# Patient Record
Sex: FEMALE | Race: WHITE | NOT HISPANIC OR LATINO | Employment: FULL TIME | URBAN - NONMETROPOLITAN AREA
[De-identification: names, ages, dates, MRNs, and addresses within clinical notes are randomized per-mention and may not be internally consistent; named-entity substitution may affect disease eponyms.]

---

## 2017-06-29 ENCOUNTER — TRANSCRIBE ORDERS (OUTPATIENT)
Dept: ADMINISTRATIVE | Facility: HOSPITAL | Age: 62
End: 2017-06-29

## 2017-06-29 DIAGNOSIS — Z13.9 SCREENING: Primary | ICD-10-CM

## 2017-07-18 ENCOUNTER — HOSPITAL ENCOUNTER (OUTPATIENT)
Dept: MAMMOGRAPHY | Facility: HOSPITAL | Age: 62
Discharge: HOME | End: 2017-07-18
Admitting: FAMILY MEDICINE

## 2017-07-18 DIAGNOSIS — Z13.9 SCREENING: ICD-10-CM

## 2017-07-18 PROCEDURE — G0202 SCR MAMMO BI INCL CAD: HCPCS

## 2017-07-18 PROCEDURE — 77063 BREAST TOMOSYNTHESIS BI: CPT

## 2017-11-08 ENCOUNTER — TRANSCRIBE ORDERS (OUTPATIENT)
Dept: ADMINISTRATIVE | Facility: HOSPITAL | Age: 62
End: 2017-11-08

## 2017-11-08 DIAGNOSIS — S80.01XA CONTUSION OF RIGHT KNEE, INITIAL ENCOUNTER: Primary | ICD-10-CM

## 2017-11-09 ENCOUNTER — HOSPITAL ENCOUNTER (OUTPATIENT)
Dept: MRI IMAGING | Facility: HOSPITAL | Age: 62
Discharge: HOME | End: 2017-11-09
Admitting: NURSE PRACTITIONER

## 2017-11-09 DIAGNOSIS — S80.01XA CONTUSION OF RIGHT KNEE, INITIAL ENCOUNTER: ICD-10-CM

## 2017-11-09 PROCEDURE — 73721 MRI JNT OF LWR EXTRE W/O DYE: CPT

## 2017-11-15 ENCOUNTER — OFFICE VISIT (OUTPATIENT)
Dept: ORTHOPEDIC SURGERY | Facility: CLINIC | Age: 62
End: 2017-11-15

## 2017-11-15 VITALS
BODY MASS INDEX: 33.82 KG/M2 | SYSTOLIC BLOOD PRESSURE: 163 MMHG | DIASTOLIC BLOOD PRESSURE: 74 MMHG | HEART RATE: 71 BPM | WEIGHT: 203 LBS | HEIGHT: 65 IN

## 2017-11-15 DIAGNOSIS — S80.01XA CONTUSION OF RIGHT KNEE, INITIAL ENCOUNTER: ICD-10-CM

## 2017-11-15 DIAGNOSIS — M25.561 ACUTE PAIN OF RIGHT KNEE: Primary | ICD-10-CM

## 2017-11-15 DIAGNOSIS — M70.41 PREPATELLAR BURSITIS OF RIGHT KNEE: ICD-10-CM

## 2017-11-15 PROCEDURE — 99204 OFFICE O/P NEW MOD 45 MIN: CPT | Performed by: ORTHOPAEDIC SURGERY

## 2017-11-15 RX ORDER — ESTRADIOL 0.5 MG/1
0.5 TABLET ORAL DAILY
COMMUNITY

## 2017-11-15 RX ORDER — HYDROCODONE BITARTRATE AND ACETAMINOPHEN 7.5; 325 MG/1; MG/1
TABLET ORAL
Refills: 0 | COMMUNITY
Start: 2017-10-31 | End: 2019-11-25

## 2017-11-15 NOTE — PROGRESS NOTES
[facility] [place] [facility] Note    Subjective     Chief Complaint   Patient presents with   • Right Knee - Pain        HPI      Zuri Love is a 62 y.o. female.  Patient had a fall at work on October 31, 2017.  She's doing better pain is 1 out of 10.  It is aching and stabbing.  It is worse with standing better with ice.        Past Medical History:   Diagnosis Date   • Ulcer       Past Surgical History:   Procedure Laterality Date   • FACIAL RECONSTRUCTION SURGERY     • HYSTERECTOMY     • KNEE SURGERY Left 02/2015    arthroscopy      Family History   Problem Relation Age of Onset   • Hypertension Mother    • Hypertension Father    • Diabetes Father      Social History     Social History   • Marital status: Single     Spouse name: N/A   • Number of children: N/A   • Years of education: N/A     Occupational History   • Not on file.     Social History Main Topics   • Smoking status: Never Smoker   • Smokeless tobacco: Never Used   • Alcohol use Yes      Comment: occ   • Drug use: No   • Sexual activity: Defer     Other Topics Concern   • Not on file     Social History Narrative      No current outpatient prescriptions on file prior to visit.     No current facility-administered medications on file prior to visit.       No Known Allergies     The following portions of the patient's history were reviewed and updated as appropriate: allergies, current medications, past family history, past medical history, past social history, past surgical history and problem list.    Review of Systems   Constitutional: Negative.    HENT: Positive for tinnitus.    Eyes: Negative.    Respiratory: Negative.    Cardiovascular: Negative.    Gastrointestinal: Negative.    Endocrine: Negative.    Genitourinary: Negative.    Musculoskeletal: Positive for arthralgias.   Skin: Negative.    Allergic/Immunologic: Negative.    Neurological: Negative.    Hematological: Negative.    Psychiatric/Behavioral: Negative.      "    Objective      Physical Exam  /74  Pulse 71  Ht 65\" (165.1 cm)  Wt 203 lb (92.1 kg)  BMI 33.78 kg/m2    Body mass index is 33.78 kg/(m^2).        GENERAL APPEARANCE: awake, alert & oriented x 3, in no acute distress and well developed, well nourished  PSYCH: normal mood andaffect  LUNGS:  breathing nonlabored, no wheezing  EYES: sclera anicteric, pupils equal  CARDIOVASCULAR: palpable pulses dorsalis pedis, palpable posterior tibial bilaterally. Capillary refill less than 2 seconds  INTEGUMENTARY: skin intact, no clubbing, cyanosis  NEUROLOGIC:  Normal gait and balance            Ortho Exam  Peripheral Vascular:    Upper Extremity:   Inspection:  Left--no cyanotic nail beds Right--no cyanotic nail beds   Bilateral:  Pink nail beds with brisk capillary refill   Palpation:  Bilateral radial pulse normal    Musculoskeletal:  Global Assessment:  Overall assessment of Lower Extremity Muscle Strength and Tone:    Right quadriceps--5/5  Right hamstrings--5/5  Right tibialis anterior--5/5  Right gastroc soleus--5/5  Right EHL--5/5    Lower Extremity:  Knee/Patella:  No digital clubbing or cyanosis.    Examination of right knee reveals:  Normal deep tendon reflexes, coordination, strength, tone, sensation.  No known fractures or deformities.    Inspection and Palpation:      Right knee:  Tenderness:  Anterior knee with prepatellar bursal swelling there is no erythema.  Effusion:  none  Crepitus:  none  Pulses:  2+  Ecchymosis:  None  Warmth:  None     ROM:  Right:  Extension:0    Flexion:135  Left:  Extension:0     Flexion:135    Instability:      Right:  Lachman Test:  Negative, Varus stress test negative, Valgus stress test negative   Anterior Drawer Test:  Negative, Posterior Drawer Test:  Negative    Deformities/Malalignments/Discrepancies:    Left:  none  Right:  none    Functional Testing:  Right:  Jamal's test:  Negative  Patella grind test:  Negative  Q-angle:  Normal  Apprehension Sign:  " Negative        Imaging/Studies  Imaging Results (last 24 hours)     Procedure Component Value Units Date/Time    XR Knee 1 or 2 View Right [[id number]] Resulted:  11/15/17 1411     Updated:  11/15/17 1411    Narrative:       Knee X-Ray  Indication: Pain    Upright AP  Lateral,  views of right knee     Findings:  No fracture  No bony lesion  Normal soft tissues  Normal joint spaces    No prior studies were available for comparison.            Assessment/Plan      Zuri was seen today for pain.    Diagnoses and all orders for this visit:    Acute pain of right knee  -     XR Knee 1 or 2 View Right    Contusion of right knee, initial encounter    Prepatellar bursitis of right knee    She will continue rest ice compression and elevation.  She'll follow up in 3 weeks.  She can work without restriction Medical Decision Making  Management Options : over-the-counter medicine  Data/Risk: radiology tests and independent visualization of imaging, lab tests, or EMG/NCV    Bg Sunshine MD  11/15/17  2:11 PM

## 2017-12-04 ENCOUNTER — OFFICE VISIT (OUTPATIENT)
Dept: ORTHOPEDIC SURGERY | Facility: CLINIC | Age: 62
End: 2017-12-04

## 2017-12-04 VITALS
HEIGHT: 65 IN | WEIGHT: 200.62 LBS | SYSTOLIC BLOOD PRESSURE: 152 MMHG | BODY MASS INDEX: 33.43 KG/M2 | HEART RATE: 84 BPM | DIASTOLIC BLOOD PRESSURE: 76 MMHG

## 2017-12-04 DIAGNOSIS — M70.41 PREPATELLAR BURSITIS OF RIGHT KNEE: Primary | ICD-10-CM

## 2017-12-04 PROCEDURE — 99212 OFFICE O/P EST SF 10 MIN: CPT | Performed by: ORTHOPAEDIC SURGERY

## 2017-12-04 NOTE — PROGRESS NOTES
[facility] [place] [facility] Note    Subjective     Chief Complaint   Patient presents with   • Follow-up     3 week - Acute pain of right knee        HPI      Zuri Love is a 62 y.o. female.  She is doing much better with the right knee pain is 0 out of 10.  It does not keep her from doing anything.        Past Medical History:   Diagnosis Date   • Ulcer       Past Surgical History:   Procedure Laterality Date   • FACIAL RECONSTRUCTION SURGERY     • HYSTERECTOMY     • KNEE SURGERY Left 02/2015    arthroscopy      Family History   Problem Relation Age of Onset   • Hypertension Mother    • Hypertension Father    • Diabetes Father      Social History     Social History   • Marital status: Single     Spouse name: N/A   • Number of children: N/A   • Years of education: N/A     Occupational History   • Not on file.     Social History Main Topics   • Smoking status: Never Smoker   • Smokeless tobacco: Never Used   • Alcohol use Yes      Comment: occ   • Drug use: No   • Sexual activity: Defer     Other Topics Concern   • Not on file     Social History Narrative      Current Outpatient Prescriptions on File Prior to Visit   Medication Sig Dispense Refill   • Ascorbic Acid (VITAMIN C PO) Take  by mouth.     • Calcium-Magnesium-Vitamin D (CALCIUM 500 PO) Take  by mouth.     • estradiol (ESTRACE) 0.5 MG tablet Take 0.5 mg by mouth Daily.     • HYDROcodone-acetaminophen (NORCO) 7.5-325 MG per tablet TAKE 1 TABLET BY MOUTH EVERY 6 TO 8 HOURS AS NEEDED FOR PAIN  0   • Iodine, Kelp, (KELP PO) Take  by mouth.     • Misc Natural Products (GLUCOSAMINE CHONDROITIN ADV PO) Take  by mouth.     • Omega-3 Fatty Acids (FISH OIL PO) Take  by mouth.     • TURMERIC PO Take  by mouth.     • Unable to find 1 each 1 (One) Time. Med Name:   CBD OIL- 1 squirt       No current facility-administered medications on file prior to visit.       No Known Allergies     The following portions of the patient's history were reviewed and  "updated as appropriate: allergies, current medications, past family history, past medical history, past social history, past surgical history and problem list.    Review of Systems   Constitutional: Negative.    HENT: Negative.    Eyes: Negative.    Respiratory: Negative.    Cardiovascular: Negative.    Gastrointestinal: Negative.    Endocrine: Negative.    Genitourinary: Negative.    Musculoskeletal: Positive for arthralgias.   Skin: Negative.    Allergic/Immunologic: Negative.    Neurological: Negative.    Hematological: Negative.    Psychiatric/Behavioral: Negative.         Objective      Physical Exam  /76  Pulse 84  Ht 65\" (165.1 cm)  Wt 200 lb 9.9 oz (91 kg)  BMI 33.38 kg/m2    Body mass index is 33.38 kg/(m^2).        GENERAL APPEARANCE: awake, alert & oriented x 3, in no acute distress and well developed, well nourished  PSYCH: normal mood andaffect  LUNGS:  breathing nonlabored, no wheezing  EYES: sclera anicteric, pupils equal  CARDIOVASCULAR: palpable pulses dorsalis pedis, palpable posterior tibial bilaterally. Capillary refill less than 2 seconds  INTEGUMENTARY: skin intact, no clubbing, cyanosis  NEUROLOGIC:  Normal gait and balance            Ortho Exam  Peripheral Vascular:    Upper Extremity:   Inspection:  Left--no cyanotic nail beds Right--no cyanotic nail beds   Bilateral:  Pink nail beds with brisk capillary refill   Palpation:  Bilateral radial pulse normal    Musculoskeletal:  Global Assessment:  Overall assessment of Lower Extremity Muscle Strength and Tone:    Right quadriceps--5/5  Right hamstrings--5/5  Right tibialis anterior--5/5  Right gastroc soleus--5/5  Right EHL--5/5    Lower Extremity:  Knee/Patella:  No digital clubbing or cyanosis.    Examination of right knee reveals:  Normal deep tendon reflexes, coordination, strength, tone, sensation.  No known fractures or deformities.    Inspection and Palpation:      Right knee:  Tenderness:  none  Effusion:  none  Crepitus:  " none  Pulses:  2+  Ecchymosis:  None  Warmth:  None     ROM:  Right:  Extension:0    Flexion:135  Left:  Extension:0     Flexion:135    Instability:      Right:  Lachman Test:  Negative, Varus stress test negative, Valgus stress test negative   Anterior Drawer Test:  Negative, Posterior Drawer Test:  Negative    Deformities/Malalignments/Discrepancies:    Left:  none  Right:  none    Functional Testing:  Right:  Jamal's test:  Negative  Patella grind test:  Negative  Q-angle:  Normal  Apprehension Sign:  Negative        Imaging/Studies  Imaging Results (last 24 hours)     ** No results found for the last 24 hours. **          Assessment/Plan      Zuri was seen today for follow-up.    Diagnoses and all orders for this visit:    Prepatellar bursitis of right knee      She is doing great and will follow-up as needed    Medical Decision Making  Management Options : over-the-counter medicine      Bg Sunshine MD  12/04/17  10:46 AM

## 2018-01-10 ENCOUNTER — OFFICE VISIT (OUTPATIENT)
Dept: ORTHOPEDIC SURGERY | Facility: CLINIC | Age: 63
End: 2018-01-10

## 2018-01-10 VITALS
DIASTOLIC BLOOD PRESSURE: 76 MMHG | BODY MASS INDEX: 34.16 KG/M2 | SYSTOLIC BLOOD PRESSURE: 136 MMHG | HEIGHT: 65 IN | WEIGHT: 205.03 LBS | HEART RATE: 74 BPM

## 2018-01-10 DIAGNOSIS — M70.41 PREPATELLAR BURSITIS OF RIGHT KNEE: Primary | ICD-10-CM

## 2018-01-10 PROCEDURE — 99213 OFFICE O/P EST LOW 20 MIN: CPT | Performed by: ORTHOPAEDIC SURGERY

## 2018-01-10 NOTE — PROGRESS NOTES
[facility] [place] [facility] [place]    Subjective     Chief Complaint   Patient presents with   • Right Knee - Follow-up     5 week follow up        HPI      Zuri Love is a 62 y.o. female.  Follow work over a month ago.  She had a MRI back in November which showed prepatellar bursitis soft tissue edema and small radial tear of the medial meniscus. she has not been in physical therapy.  Her pain is 7 out of 10 and she believe she is getting worse.  She works for herself and his full duty.  She complains of being unable to walk without a limp.        Past Medical History:   Diagnosis Date   • Ulcer       Past Surgical History:   Procedure Laterality Date   • FACIAL RECONSTRUCTION SURGERY     • HYSTERECTOMY     • KNEE SURGERY Left 02/2015    arthroscopy      Family History   Problem Relation Age of Onset   • Hypertension Mother    • Hypertension Father    • Diabetes Father      Social History     Social History   • Marital status: Single     Spouse name: N/A   • Number of children: N/A   • Years of education: N/A     Occupational History   • Not on file.     Social History Main Topics   • Smoking status: Never Smoker   • Smokeless tobacco: Never Used   • Alcohol use Yes      Comment: occ   • Drug use: No   • Sexual activity: Defer     Other Topics Concern   • Not on file     Social History Narrative      Current Outpatient Prescriptions on File Prior to Visit   Medication Sig Dispense Refill   • Ascorbic Acid (VITAMIN C PO) Take  by mouth.     • Calcium-Magnesium-Vitamin D (CALCIUM 500 PO) Take  by mouth.     • estradiol (ESTRACE) 0.5 MG tablet Take 0.5 mg by mouth Daily.     • HYDROcodone-acetaminophen (NORCO) 7.5-325 MG per tablet TAKE 1 TABLET BY MOUTH EVERY 6 TO 8 HOURS AS NEEDED FOR PAIN  0   • Iodine, Kelp, (KELP PO) Take  by mouth.     • Misc Natural Products (GLUCOSAMINE CHONDROITIN ADV PO) Take  by mouth.     • Omega-3 Fatty Acids (FISH OIL PO) Take  by mouth.     • TURMERIC PO Take  by  "mouth.     • Unable to find 1 each 1 (One) Time. Med Name:   CBD OIL- 1 squirt       No current facility-administered medications on file prior to visit.       No Known Allergies     The following portions of the patient's history were reviewed and updated as appropriate: allergies, current medications, past family history, past medical history, past social history, past surgical history and problem list.    Review of Systems   Constitutional: Negative.    HENT: Negative.    Eyes: Negative.    Respiratory: Negative.    Cardiovascular: Negative.    Gastrointestinal: Negative.    Endocrine: Negative.    Genitourinary: Negative.    Musculoskeletal: Positive for arthralgias.   Skin: Negative.    Allergic/Immunologic: Negative.    Neurological: Negative.    Hematological: Negative.    Psychiatric/Behavioral: Negative.         Objective      Physical Exam  /76  Pulse 74  Ht 165.1 cm (65\")  Wt 93 kg (205 lb 0.4 oz)  BMI 34.12 kg/m2    Body mass index is 34.12 kg/(m^2).        GENERAL APPEARANCE: awake, alert & oriented x 3, in no acute distress and well developed, well nourished  PSYCH: normal mood andaffect  LUNGS:  breathing nonlabored, no wheezing  EYES: sclera anicteric, pupils equal  CARDIOVASCULAR: palpable pulses dorsalis pedis, palpable posterior tibial bilaterally. Capillary refill less than 2 seconds  INTEGUMENTARY: skin intact, no clubbing, cyanosis  NEUROLOGIC:  Limping gait and balance            Ortho Exam  Peripheral Vascular:    Upper Extremity:   Inspection:  Left--no cyanotic nail beds Right--no cyanotic nail beds   Bilateral:  Pink nail beds with brisk capillary refill   Palpation:  Bilateral radial pulse normal    Musculoskeletal:  Global Assessment:  Overall assessment of Lower Extremity Muscle Strength and Tone:  Left quadriceps--5/5   Left hamstrings--5/5       Left tibialis anterior--5/5  Left gastroc-soleus--5/5  Left EHL --5/5    Lower Extremity:  Knee/Patella:  No digital clubbing or " cyanosis.    Examination of left knee reveals:  Normal deep tendon reflexes, coordination, strength, tone, sensation.  No known fractures or deformities.    Inspection and Palpation:  Left knee:  Tenderness:  Over the medial patella with no joint line tenderness  Effusion:  none  Crepitus:  Positive  Pulses:  2+  Ecchymosis:  None  Warmth:  None     ROM:  Right:  Extension:5    Flexion:120  Left:  Extension:5     Flexion:120    Instability:    Left:  Lachman Test:  Negative, Varus stress test negative, Valgus stress test negative    Deformities/Malalignments/Discrepancies:    Left:  Genu Varum   Right:  No deformity    Functional Testing:  Jamla's test:  Negative  Patella grind test:  Positive  Q-angle:  normalPeripheral Vascular:    Upper Extremity:   Inspection:  Left--no cyanotic nail beds Right--no cyanotic nail beds   Bilateral:  Pink nail beds with brisk capillary refill   Palpation:  Bilateral radial pulse normal    Musculoskeletal:  Global Assessment:  Overall assessment of Lower Extremity Muscle Strength and Tone:    Right quadriceps--5/5  Right hamstrings--5/5  Right tibialis anterior--5/5  Right gastroc soleus--5/5  Right EHL--5/5    Lower Extremity:  Knee/Patella:  No digital clubbing or cyanosis.    Examination of right knee reveals:  Normal deep tendon reflexes, coordination, strength, tone, sensation.  No known fractures or deformities.    Inspection and Palpation:      Right knee:  Tenderness:  none  Effusion:  none  Crepitus:  none  Pulses:  2+  Ecchymosis:  None  Warmth:  None     ROM:  Right:  Extension:0    Flexion:135  Left:  Extension:0     Flexion:135    Instability:      Right:  Lachman Test:  Negative, Varus stress test negative, Valgus stress test negative   Anterior Drawer Test:  Negative, Posterior Drawer Test:  Negative    Deformities/Malalignments/Discrepancies:    Left:  none  Right:  none    Functional Testing:  Right:  Jamal's test:  Negative  Patella grind test:   Negative  Q-angle:  Normal  Apprehension Sign:  Negative        Imaging/Studies  Imaging Results (last 24 hours)     ** No results found for the last 24 hours. **      I reviewed her MRI from November which shows prepatellar swelling soft tissue edema and a small radial meniscus tear    Assessment/Plan      Zuri was seen today for follow-up.    Diagnoses and all orders for this visit:    Prepatellar bursitis of right knee  -     Ambulatory Referral to Physical Therapy  She needs physical therapy to improve her gait and she will follow-up in 3-4 weeks at this time she does not need surgery I believe her meniscus tear to be an incidental asymptomatic finding    Medical Decision Making  Management Options : over-the-counter medicine and physical/occupational therapy  Data/Risk: radiology tests and independent visualization of imaging, lab tests, or EMG/NCV    Bg Sunshine MD  01/10/18  2:41 PM

## 2018-01-25 ENCOUNTER — TREATMENT (OUTPATIENT)
Dept: PHYSICAL THERAPY | Facility: CLINIC | Age: 63
End: 2018-01-25

## 2018-01-25 DIAGNOSIS — M25.561 ACUTE PAIN OF RIGHT KNEE: Primary | ICD-10-CM

## 2018-01-25 PROCEDURE — 97001 PR PHYS THERAPY EVALUATION: CPT | Performed by: PHYSICAL THERAPIST

## 2018-01-25 PROCEDURE — 97530 THERAPEUTIC ACTIVITIES: CPT | Performed by: PHYSICAL THERAPIST

## 2018-01-25 NOTE — PROGRESS NOTES
Physical Therapy Initial Evaluation and Plan of Care      Patient: Zuri Love   : 1955  Diagnosis/ICD-10 Code:  No primary diagnosis found.  Referring practitioner: Bg Sunshine MD    Subjective Evaluation    History of Present Illness  Mechanism of injury: Pt reports  she fell onto the R knee.  Very painful.      Pt recently has been more diligent with ibuprofen and icing.  Over the past 10 days her pain has greatly reduced.  She currently has no pain at this time.       Patient Occupation: instrument repair at [employer]  Current pain ratin  At worst pain rating: 3 (this past week.   pt reports 10/10 pain)  Location: medial knee  Relieving factors: medications, ice and rest  Aggravating factors: stairs and squatting  Progression: resolved             Objective       Palpation     Additional Palpation Details  No pain upon palpation today.     Active Range of Motion   Left Knee   Flexion: WFL  Extension: WFL    Right Knee   Flexion: WFL  Extension: WFL    Patellar Mobility     Right Knee Patellar tendons within functional limits include the medial, lateral, superior and inferior.     Patellar Static Positioning   Left Knee: WFL  Right Knee: WFL    Strength/Myotome Testing     Left Hip   Planes of Motion   Extension: 4+  Abduction: 4+    Right Hip   Planes of Motion   Extension: 4  Abduction: 4    Left Knee   Flexion: 5  Prone flexion: 5  Extension: 5  Quadriceps contraction: good    Right Knee   Flexion: 4+  Prone flexion: 4+  Extension: 4+  Quadriceps contraction: good    Tests     Right Knee   Negative patellar apprehension and patellar compression.     Ambulation     Observational Gait   Gait: within functional limits   Walking speed and stride length within functional limits.   Base of support: normal    Functional Assessment     Single Leg Stance   Left: 15 seconds  Right: 15 seconds         Assessment & Plan     Assessment  Assessment details: Patient is a 62 year  old female who comes to physical therapy s/p knee contusion injury. Signs and symptoms are consistent with diagnosis but she recently has seen success with anti-inflammatories and ice.  Over the last 10 days she has been pain free and returning to normal function.  She want to try to return to all activity and feels like no PT is needed at this time.  Pt instructed on HEP and avoiding PF pain with activity.    Prognosis: good    Plan  Treatment plan discussed with: patient  Plan details: Pt to call within 3 weeks if sxs re-occur to be re-assessed.             Manual Therapy:         mins  13610;  Therapeutic Exercise:         mins  18951;     Neuromuscular Charmaine:        mins  61306;    Therapeutic Activity:     9     mins  27428;     Gait Training:           mins  38255;     Ultrasound:          mins  43021;    Electrical Stimulation:         mins  84749 ( );  Dry Needling          mins self-pay    Timed Treatment:   9   mins   Total Treatment:     36   mins    PT SIGNATURE: Pawel Walsh, PT   DATE TREATMENT INITIATED: 1/25/2018    Initial Certification  Certification Period: 4/25/2018  I certify that the therapy services are furnished while this patient is under my care.  The services outlined above are required by this patient, and will be reviewed every 90 days.     PHYSICIAN: Bg Sunshine MD      DATE:     Please sign and return via fax to  .. Thank you, [facility].

## 2018-07-26 ENCOUNTER — TRANSCRIBE ORDERS (OUTPATIENT)
Dept: ADMINISTRATIVE | Facility: HOSPITAL | Age: 63
End: 2018-07-26

## 2018-07-26 DIAGNOSIS — Z12.39 SCREENING BREAST EXAMINATION: Primary | ICD-10-CM

## 2018-08-30 ENCOUNTER — APPOINTMENT (OUTPATIENT)
Dept: MAMMOGRAPHY | Facility: HOSPITAL | Age: 63
End: 2018-08-30

## 2018-10-01 ENCOUNTER — APPOINTMENT (OUTPATIENT)
Dept: MAMMOGRAPHY | Facility: HOSPITAL | Age: 63
End: 2018-10-01

## 2018-10-09 ENCOUNTER — HOSPITAL ENCOUNTER (OUTPATIENT)
Dept: MAMMOGRAPHY | Facility: HOSPITAL | Age: 63
Discharge: HOME | End: 2018-10-09
Admitting: FAMILY MEDICINE

## 2018-10-09 DIAGNOSIS — Z12.39 SCREENING BREAST EXAMINATION: ICD-10-CM

## 2018-10-09 PROCEDURE — 77067 SCR MAMMO BI INCL CAD: CPT

## 2018-10-09 PROCEDURE — 77063 BREAST TOMOSYNTHESIS BI: CPT

## 2018-11-27 ENCOUNTER — TRANSCRIBE ORDERS (OUTPATIENT)
Dept: ADMINISTRATIVE | Facility: HOSPITAL | Age: 63
End: 2018-11-27

## 2018-11-27 ENCOUNTER — APPOINTMENT (OUTPATIENT)
Dept: LAB | Facility: HOSPITAL | Age: 63
End: 2018-11-27

## 2018-11-27 DIAGNOSIS — Z00.00 ENCOUNTER FOR GENERAL ADULT MEDICAL EXAMINATION WITHOUT ABNORMAL FINDINGS: Primary | ICD-10-CM

## 2018-11-27 LAB
ALBUMIN SERPL-MCNC: 4.5 G/DL (ref 3.5–5)
ALBUMIN/GLOB SERPL: 1.6 G/DL (ref 1–2)
ALP SERPL-CCNC: 51 U/L (ref 38–126)
ALT SERPL W P-5'-P-CCNC: 36 U/L (ref 13–69)
ANION GAP SERPL CALCULATED.3IONS-SCNC: 8.2 MMOL/L (ref 10–20)
AST SERPL-CCNC: 27 U/L (ref 15–46)
BASOPHILS # BLD AUTO: 0.03 10*3/MM3 (ref 0–0.2)
BASOPHILS NFR BLD AUTO: 0.4 % (ref 0–2.5)
BILIRUB SERPL-MCNC: 0.9 MG/DL (ref 0.2–1.3)
BUN BLD-MCNC: 14 MG/DL (ref 7–20)
BUN/CREAT SERPL: 20 (ref 7.1–23.5)
CALCIUM SPEC-SCNC: 8.8 MG/DL (ref 8.4–10.2)
CHLORIDE SERPL-SCNC: 105 MMOL/L (ref 98–107)
CHOLEST SERPL-MCNC: 214 MG/DL (ref 0–199)
CO2 SERPL-SCNC: 29 MMOL/L (ref 26–30)
CREAT BLD-MCNC: 0.7 MG/DL (ref 0.6–1.3)
DEPRECATED RDW RBC AUTO: 40.3 FL (ref 37–54)
EOSINOPHIL # BLD AUTO: 0.15 10*3/MM3 (ref 0–0.7)
EOSINOPHIL NFR BLD AUTO: 2 % (ref 0–7)
ERYTHROCYTE [DISTWIDTH] IN BLOOD BY AUTOMATED COUNT: 12.3 % (ref 11.5–14.5)
GFR SERPL CREATININE-BSD FRML MDRD: 85 ML/MIN/1.73
GLOBULIN UR ELPH-MCNC: 2.9 GM/DL
GLUCOSE BLD-MCNC: 104 MG/DL (ref 74–98)
HBA1C MFR BLD: 5.6 % (ref 3–6)
HCT VFR BLD AUTO: 41.7 % (ref 37–47)
HDLC SERPL-MCNC: 66 MG/DL (ref 40–60)
HGB BLD-MCNC: 14.1 G/DL (ref 12–16)
IMM GRANULOCYTES # BLD: 0.03 10*3/MM3 (ref 0–0.06)
IMM GRANULOCYTES NFR BLD: 0.4 % (ref 0–0.6)
LDLC SERPL CALC-MCNC: 123 MG/DL (ref 0–99)
LDLC/HDLC SERPL: 1.86 {RATIO}
LYMPHOCYTES # BLD AUTO: 1.97 10*3/MM3 (ref 0.6–3.4)
LYMPHOCYTES NFR BLD AUTO: 26.3 % (ref 10–50)
MCH RBC QN AUTO: 30.5 PG (ref 27–31)
MCHC RBC AUTO-ENTMCNC: 33.8 G/DL (ref 30–37)
MCV RBC AUTO: 90.3 FL (ref 81–99)
MONOCYTES # BLD AUTO: 0.61 10*3/MM3 (ref 0–0.9)
MONOCYTES NFR BLD AUTO: 8.1 % (ref 0–12)
NEUTROPHILS # BLD AUTO: 4.7 10*3/MM3 (ref 2–6.9)
NEUTROPHILS NFR BLD AUTO: 62.8 % (ref 37–80)
NRBC BLD MANUAL-RTO: 0 /100 WBC (ref 0–0)
PLATELET # BLD AUTO: 190 10*3/MM3 (ref 130–400)
PMV BLD AUTO: 10.3 FL (ref 6–12)
POTASSIUM BLD-SCNC: 4.2 MMOL/L (ref 3.5–5.1)
PROT SERPL-MCNC: 7.4 G/DL (ref 6.3–8.2)
RBC # BLD AUTO: 4.62 10*6/MM3 (ref 4.2–5.4)
SODIUM BLD-SCNC: 138 MMOL/L (ref 137–145)
TRIGL SERPL-MCNC: 125 MG/DL
VLDLC SERPL-MCNC: 25 MG/DL
WBC NRBC COR # BLD: 7.49 10*3/MM3 (ref 4.8–10.8)

## 2018-11-27 PROCEDURE — 80061 LIPID PANEL: CPT | Performed by: FAMILY MEDICINE

## 2018-11-27 PROCEDURE — 83036 HEMOGLOBIN GLYCOSYLATED A1C: CPT | Performed by: FAMILY MEDICINE

## 2018-11-27 PROCEDURE — 36415 COLL VENOUS BLD VENIPUNCTURE: CPT | Performed by: FAMILY MEDICINE

## 2018-11-27 PROCEDURE — 85025 COMPLETE CBC W/AUTO DIFF WBC: CPT | Performed by: FAMILY MEDICINE

## 2018-11-27 PROCEDURE — 80053 COMPREHEN METABOLIC PANEL: CPT | Performed by: FAMILY MEDICINE

## 2018-12-04 ENCOUNTER — TRANSCRIBE ORDERS (OUTPATIENT)
Dept: BONE DENSITY | Facility: HOSPITAL | Age: 63
End: 2018-12-04

## 2018-12-04 DIAGNOSIS — Z78.0 POSTMENOPAUSAL: Primary | ICD-10-CM

## 2018-12-11 ENCOUNTER — APPOINTMENT (OUTPATIENT)
Dept: BONE DENSITY | Facility: HOSPITAL | Age: 63
End: 2018-12-11

## 2018-12-11 DIAGNOSIS — Z78.0 POSTMENOPAUSAL: ICD-10-CM

## 2018-12-11 PROCEDURE — 77080 DXA BONE DENSITY AXIAL: CPT

## 2019-09-16 ENCOUNTER — TRANSCRIBE ORDERS (OUTPATIENT)
Dept: ADMINISTRATIVE | Facility: HOSPITAL | Age: 64
End: 2019-09-16

## 2019-09-16 DIAGNOSIS — Z12.31 ENCOUNTER FOR SCREENING MAMMOGRAM FOR MALIGNANT NEOPLASM OF BREAST: Primary | ICD-10-CM

## 2019-10-08 ENCOUNTER — LAB (OUTPATIENT)
Dept: LAB | Facility: HOSPITAL | Age: 64
End: 2019-10-08

## 2019-10-08 ENCOUNTER — OFFICE VISIT (OUTPATIENT)
Dept: GASTROENTEROLOGY | Facility: CLINIC | Age: 64
End: 2019-10-08

## 2019-10-08 VITALS
TEMPERATURE: 97.8 F | DIASTOLIC BLOOD PRESSURE: 73 MMHG | RESPIRATION RATE: 16 BRPM | HEIGHT: 65 IN | WEIGHT: 209 LBS | BODY MASS INDEX: 34.82 KG/M2 | HEART RATE: 78 BPM | SYSTOLIC BLOOD PRESSURE: 170 MMHG

## 2019-10-08 DIAGNOSIS — R11.0 NAUSEA: ICD-10-CM

## 2019-10-08 DIAGNOSIS — R12 HEARTBURN: ICD-10-CM

## 2019-10-08 DIAGNOSIS — R10.31 RIGHT LOWER QUADRANT ABDOMINAL PAIN: ICD-10-CM

## 2019-10-08 DIAGNOSIS — R10.31 RIGHT LOWER QUADRANT ABDOMINAL PAIN: Primary | ICD-10-CM

## 2019-10-08 LAB
ALBUMIN SERPL-MCNC: 4.4 G/DL (ref 3.5–5.2)
ALBUMIN/GLOB SERPL: 1.6 G/DL
ALP SERPL-CCNC: 49 U/L (ref 39–117)
ALT SERPL W P-5'-P-CCNC: 24 U/L (ref 1–33)
ANION GAP SERPL CALCULATED.3IONS-SCNC: 11.8 MMOL/L (ref 5–15)
AST SERPL-CCNC: 22 U/L (ref 1–32)
BILIRUB SERPL-MCNC: 0.6 MG/DL (ref 0.2–1.2)
BUN BLD-MCNC: 17 MG/DL (ref 8–23)
BUN/CREAT SERPL: 14.8 (ref 7–25)
CALCIUM SPEC-SCNC: 9 MG/DL (ref 8.6–10.5)
CHLORIDE SERPL-SCNC: 102 MMOL/L (ref 98–107)
CO2 SERPL-SCNC: 27.2 MMOL/L (ref 22–29)
CREAT BLD-MCNC: 1.15 MG/DL (ref 0.57–1)
CRP SERPL-MCNC: 0.92 MG/DL (ref 0–0.5)
DEPRECATED RDW RBC AUTO: 42.3 FL (ref 37–54)
ERYTHROCYTE [DISTWIDTH] IN BLOOD BY AUTOMATED COUNT: 12.9 % (ref 12.3–15.4)
GFR SERPL CREATININE-BSD FRML MDRD: 48 ML/MIN/1.73
GLOBULIN UR ELPH-MCNC: 2.8 GM/DL
GLUCOSE BLD-MCNC: 100 MG/DL (ref 65–99)
HCT VFR BLD AUTO: 39.3 % (ref 34–46.6)
HGB BLD-MCNC: 13.4 G/DL (ref 12–15.9)
LIPASE SERPL-CCNC: 24 U/L (ref 13–60)
MCH RBC QN AUTO: 30.3 PG (ref 26.6–33)
MCHC RBC AUTO-ENTMCNC: 34.1 G/DL (ref 31.5–35.7)
MCV RBC AUTO: 88.9 FL (ref 79–97)
PLATELET # BLD AUTO: 194 10*3/MM3 (ref 140–450)
PMV BLD AUTO: 11.5 FL (ref 6–12)
POTASSIUM BLD-SCNC: 4 MMOL/L (ref 3.5–5.2)
PROT SERPL-MCNC: 7.2 G/DL (ref 6–8.5)
RBC # BLD AUTO: 4.42 10*6/MM3 (ref 3.77–5.28)
SODIUM BLD-SCNC: 141 MMOL/L (ref 136–145)
WBC NRBC COR # BLD: 6.89 10*3/MM3 (ref 3.4–10.8)

## 2019-10-08 PROCEDURE — 80053 COMPREHEN METABOLIC PANEL: CPT

## 2019-10-08 PROCEDURE — 86140 C-REACTIVE PROTEIN: CPT

## 2019-10-08 PROCEDURE — 36415 COLL VENOUS BLD VENIPUNCTURE: CPT

## 2019-10-08 PROCEDURE — 83690 ASSAY OF LIPASE: CPT

## 2019-10-08 PROCEDURE — 85027 COMPLETE CBC AUTOMATED: CPT

## 2019-10-08 PROCEDURE — 99204 OFFICE O/P NEW MOD 45 MIN: CPT | Performed by: INTERNAL MEDICINE

## 2019-10-08 RX ORDER — ONDANSETRON 4 MG/1
4 TABLET, FILM COATED ORAL EVERY 8 HOURS PRN
Qty: 30 TABLET | Refills: 1 | Status: SHIPPED | OUTPATIENT
Start: 2019-10-08 | End: 2019-11-25

## 2019-10-08 RX ORDER — LEVOFLOXACIN 500 MG/1
500 TABLET, FILM COATED ORAL DAILY
Qty: 7 TABLET | Refills: 0 | Status: SHIPPED | OUTPATIENT
Start: 2019-10-08 | End: 2020-06-15

## 2019-10-08 RX ORDER — METRONIDAZOLE 250 MG/1
TABLET ORAL
Qty: 28 TABLET | Refills: 0 | Status: SHIPPED | OUTPATIENT
Start: 2019-10-08 | End: 2020-06-15

## 2019-10-08 NOTE — PATIENT INSTRUCTIONS
"1. Check CBC, CMP, lipase, C-reactive protein inflammatory marker.  2. Low fiber diet (avoid fruits, vegetables, cereals, fiber supplements, nuts and seeds) for 5 days thereafter low-fat high-fiber diet.  3. Levaquin (levofloxacin). Take 1 tablet by mouth once a day for 7 days. Side effects were discussed.  4. Flagyl (metronidazole) tablets 250 mg. Take 1 tablet one by mouth 4 times a day for 7 days.  Side effects were discussed.  5. Avoid laxatives, enemas for next 5 days.  However, for constipation the patient may use \"stool softeners\" 1-3 per day.  6. May take probiotics such as Align.  Take one orally daily while taking antibiotics and 1-2 weeks thereafter.  7. Colonoscopy: Description of the procedure, risks benefits alternatives and options including non-operative options were discussed with the patient in detail.  The patient understands and wishes to proceed.  This may however be postponed for about 3-4 weeks.  8. The patient may call back in 1 week regarding progress.  9. Zofran 4 mg tablet.  1-to 3 times a day by mouth as needed for nausea.  10. Patient has been advised to avoid calcium and vitamins while taking Levaquin.  Once she finishes the course of antibiotics she may resume calcium supplements and multivitamins.        "

## 2019-10-08 NOTE — PROGRESS NOTES
Chief Complaint   Patient presents with   • Abdominal Pain     History of Present Illness     There is history of RLQ abdominal pain off and on for the last 2 months or so.  The pain is gradual in onset, moderate in severity and aching in character.  Frequency being 3-4 times a week.  The pain may last for 20 to 30 minutes.  There is no radiation of abdominal pain.  Eating certain foods like chocolates and knots make the pain worse.  The patient feels better after a bowel movement.  About 3 weeks ago the patient had some associated fever and chills.  Patient also had cramping in the right lower quadrant while sitting on the toilet.  This was associated with a bowel movement.  She denies history of diarrhea or constipation.      The patient has history of recurrent nausea for the last 2 months.  The nausea is described as moderately severe, frequency being several times a week.  Nauseous feeling may last for a couple of hours.  Eating worsens the symptom however no definite relieving factors of nausea.  There is no associated vomiting.    The patient has a history of reflux off and on for the last several years.  The reflux is moderately severe.  Symptoms are described as retrosternal burning sensation, and indigestion.  There is history of occasional regurgitative symptoms.  Frequency being several times per week.  The symptoms are worse at night.  The patient takes acid suppressive therapy with reasonable control of his symptoms.    Her last colonoscopy was 6 years ago in 2013.  The patient had multiple polyps.  There is no family history of colon cancer, inflammatory bowel disease, celiac disease or chronic liver disease.    There is history of peptic ulcer disease for which the patient was treated in 2003.     Review of Systems   Constitutional: Positive for chills and fatigue. Negative for appetite change, fever and unexpected weight change.   HENT: Negative for mouth sores, nosebleeds and trouble swallowing.     Eyes: Negative for discharge and redness.   Respiratory: Negative for apnea, cough and shortness of breath.    Cardiovascular: Negative for chest pain, palpitations and leg swelling.   Gastrointestinal: Positive for abdominal pain and nausea. Negative for abdominal distention, anal bleeding, blood in stool, constipation, diarrhea and vomiting.   Endocrine: Negative for cold intolerance, heat intolerance and polydipsia.   Genitourinary: Negative for dysuria, hematuria and urgency.   Musculoskeletal: Positive for arthralgias, back pain and myalgias. Negative for joint swelling.   Skin: Negative for rash.   Allergic/Immunologic: Positive for environmental allergies. Negative for food allergies and immunocompromised state.   Neurological: Negative for dizziness, seizures, syncope and headaches.   Hematological: Negative for adenopathy. Does not bruise/bleed easily.   Psychiatric/Behavioral: Negative for dysphoric mood. The patient is not nervous/anxious and is not hyperactive.      There is no problem list on file for this patient.    Past Medical History:   Diagnosis Date   • Arthritis    • Back pain 2000   • Injury of neck    • Kidney stone 2010   • Psoriasis    • Ulcer    • Vision problems      Past Surgical History:   Procedure Laterality Date   • FACIAL RECONSTRUCTION SURGERY     • HYSTERECTOMY  2001   • KNEE SURGERY Left 2017    arthroscopy   • TONSILLECTOMY       Family History   Problem Relation Age of Onset   • Hypertension Mother    • Diverticulitis Mother    • Hypertension Father    • Diabetes Father      Social History     Tobacco Use   • Smoking status: Never Smoker   • Smokeless tobacco: Never Used   Substance Use Topics   • Alcohol use: Yes     Alcohol/week: 1.2 oz     Types: 1 Glasses of wine, 1 Shots of liquor per week     Comment: occ       Current Outpatient Medications:   •  Ascorbic Acid (VITAMIN C PO), Take  by mouth., Disp: , Rfl:   •  estradiol (ESTRACE) 0.5 MG tablet, Take 0.5 mg by mouth  "Daily., Disp: , Rfl:   •  HYDROcodone-acetaminophen (NORCO) 7.5-325 MG per tablet, TAKE 1 TABLET BY MOUTH EVERY 6 TO 8 HOURS AS NEEDED FOR PAIN, Disp: , Rfl: 0  •  Misc Natural Products (GLUCOSAMINE CHONDROITIN ADV PO), Take  by mouth., Disp: , Rfl:   •  Omega-3 Fatty Acids (FISH OIL PO), Take  by mouth., Disp: , Rfl:   •  Red Yeast Rice Extract (RED YEAST RICE PO), Take  by mouth., Disp: , Rfl:   •  TURMERIC PO, Take  by mouth., Disp: , Rfl:   •  Unable to find, 1 each 1 (One) Time. Med Name:  CBD OIL- 1 squirt, Disp: , Rfl:   •  levoFLOXacin (LEVAQUIN) 500 MG tablet, Take 1 tablet by mouth Daily., Disp: 7 tablet, Rfl: 0  •  metroNIDAZOLE (FLAGYL) 250 MG tablet, Take 1 tablet by mouth four times a day x 7 days, Disp: 28 tablet, Rfl: 0  •  ondansetron (ZOFRAN) 4 MG tablet, Take 1 tablet by mouth Every 8 (Eight) Hours As Needed for Nausea or Vomiting., Disp: 30 tablet, Rfl: 1    No Known Allergies    Blood pressure 170/73, pulse 78, temperature 97.8 °F (36.6 °C), resp. rate 16, height 165.1 cm (65\"), weight 94.8 kg (209 lb), not currently breastfeeding.    Physical Exam   Constitutional: She is oriented to person, place, and time. She appears well-developed and well-nourished. No distress.   HENT:   Head: Normocephalic and atraumatic.   Right Ear: Hearing and external ear normal.   Left Ear: Hearing and external ear normal.   Nose: Nose normal.   Mouth/Throat: Oropharynx is clear and moist and mucous membranes are normal. Mucous membranes are not pale, not dry and not cyanotic. No oral lesions. No oropharyngeal exudate.   Eyes: Conjunctivae and EOM are normal. Right eye exhibits no discharge. Left eye exhibits no discharge. No scleral icterus.   Neck: Trachea normal. Neck supple. No JVD present. No edema present. No thyroid mass and no thyromegaly present.   Cardiovascular: Normal rate, regular rhythm, S2 normal and normal heart sounds. Exam reveals no gallop, no S3 and no friction rub.   No murmur " heard.  Pulmonary/Chest: Effort normal and breath sounds normal. No respiratory distress. She has no wheezes. She has no rales. She exhibits no tenderness.   Abdominal: Soft. Normal appearance and bowel sounds are normal. She exhibits no distension, no ascites and no mass. There is no splenomegaly or hepatomegaly. There is tenderness (mild to moderate deep tenderness in the right lumbar region.) in the right lower quadrant. There is no rigidity, no rebound and no guarding. No hernia.   Musculoskeletal: She exhibits no tenderness or deformity.     Vascular Status -  Her right foot exhibits no edema. Her left foot exhibits no edema.  Lymphadenopathy:     She has no cervical adenopathy.        Left: No supraclavicular adenopathy present.   Neurological: She is alert and oriented to person, place, and time. She has normal strength. No cranial nerve deficit or sensory deficit. She exhibits normal muscle tone. Coordination normal.   Skin: No rash noted. She is not diaphoretic. No cyanosis. No pallor. Nails show no clubbing.   Psychiatric: She has a normal mood and affect. Her behavior is normal. Judgment and thought content normal.   Nursing note and vitals reviewed.  Stigmata of chronic liver disease:  None.  Asterixis:  None.    Laboratory Testing:  Upon review of medical records:    Dated November 27, 2018 sodium 138 potassium 4.2 chloride 105 CO2 29 BUN 14 serum creatinine 0.70 glucose 104.  Calcium 8.8.  Total protein 7.4.  Albumin 4.50.  T bili 0.9 AST 27 ALT 36 alkaline phosphatase 51.  Total cholesterol 214.  Triglycerides 125.  Hemoglobin A1C 5.6%.  WBC 7.49 hemoglobin 14.1 hematocrit 41.7 MCV 90.3 and platelet count 190.    Assessment:      ICD-10-CM ICD-9-CM   1. Right lower quadrant abdominal pain R10.31 789.03   2. Heartburn R12 787.1   3. Nausea R11.0 787.02         Discussion:  1.     Plan/  Patient Instructions   1. Check CBC, CMP, lipase, C-reactive protein inflammatory marker.  2. Low fiber diet (avoid  "fruits, vegetables, cereals, fiber supplements, nuts and seeds) for 5 days thereafter low-fat high-fiber diet.  3. Levaquin (levofloxacin). Take 1 tablet by mouth once a day for 7 days. Side effects were discussed.  4. Flagyl (metronidazole) tablets 250 mg. Take 1 tablet one by mouth 4 times a day for 7 days.  Side effects were discussed.  5. Avoid laxatives, enemas for next 5 days.  However, for constipation the patient may use \"stool softeners\" 1-3 per day.  6. May take probiotics such as Align.  Take one orally daily while taking antibiotics and 1-2 weeks thereafter.  7. Colonoscopy: Description of the procedure, risks benefits alternatives and options including non-operative options were discussed with the patient in detail.  The patient understands and wishes to proceed.  This may however be postponed for about 3-4 weeks.  8. The patient may call back in 1 week regarding progress.  9. Zofran 4 mg tablet.  1-to 3 times a day by mouth as needed for nausea.  10. Patient has been advised to avoid calcium and vitamins while taking Levaquin.  Once she finishes the course of antibiotics she may resume calcium supplements and multivitamins.             Miguel A Adams MD    "

## 2019-10-21 ENCOUNTER — TELEPHONE (OUTPATIENT)
Dept: GASTROENTEROLOGY | Facility: CLINIC | Age: 64
End: 2019-10-21

## 2019-10-21 NOTE — TELEPHONE ENCOUNTER
Called patient and offered appt.  She stated she was feeling better and wanted to wait a couple weeks and call back.

## 2019-10-21 NOTE — TELEPHONE ENCOUNTER
Patient called with 1 week progress report (Week of October 14th)  Not feeling better, spoke with Dr. Adams, patient needs to be rechecked.

## 2019-11-04 ENCOUNTER — OFFICE VISIT (OUTPATIENT)
Dept: ORTHOPEDIC SURGERY | Facility: CLINIC | Age: 64
End: 2019-11-04

## 2019-11-04 VITALS — HEART RATE: 98 BPM | WEIGHT: 200.84 LBS | HEIGHT: 65 IN | BODY MASS INDEX: 33.46 KG/M2 | OXYGEN SATURATION: 98 %

## 2019-11-04 DIAGNOSIS — M23.92 INTERNAL DERANGEMENT OF LEFT KNEE: ICD-10-CM

## 2019-11-04 DIAGNOSIS — M25.562 LEFT KNEE PAIN, UNSPECIFIED CHRONICITY: Primary | ICD-10-CM

## 2019-11-04 PROCEDURE — 99213 OFFICE O/P EST LOW 20 MIN: CPT | Performed by: ORTHOPAEDIC SURGERY

## 2019-11-04 NOTE — PROGRESS NOTES
[facility] Note    Subjective     Chief Complaint   Patient presents with   • Left Knee - Pain        HPI  Zuri Love is a 64 y.o. female.  She complains of significant left knee pain for 3 weeks.  Is locking catching with severely painful pops.  She had a history of knee scope years ago.  She thinks 2015.  Pain is 3 out of 10.  Is dull aching shooting.    Past Medical History:   Diagnosis Date   • Arthritis    • Back pain 2000   • Injury of neck    • Kidney stone 2010   • Psoriasis    • Ulcer    • Vision problems       Past Surgical History:   Procedure Laterality Date   • FACIAL RECONSTRUCTION SURGERY     • HYSTERECTOMY  2001   • KNEE SURGERY Left 2017    arthroscopy   • TONSILLECTOMY        Family History   Problem Relation Age of Onset   • Hypertension Mother    • Diverticulitis Mother    • Hypertension Father    • Diabetes Father      Social History     Socioeconomic History   • Marital status: Single     Spouse name: Not on file   • Number of children: Not on file   • Years of education: Not on file   • Highest education level: Not on file   Tobacco Use   • Smoking status: Never Smoker   • Smokeless tobacco: Never Used   Substance and Sexual Activity   • Alcohol use: Yes     Alcohol/week: 1.2 oz     Types: 1 Glasses of wine, 1 Shots of liquor per week     Comment: occ   • Drug use: No   • Sexual activity: Defer      Current Outpatient Medications on File Prior to Visit   Medication Sig Dispense Refill   • Ascorbic Acid (VITAMIN C PO) Take  by mouth.     • estradiol (ESTRACE) 0.5 MG tablet Take 0.5 mg by mouth Daily.     • HYDROcodone-acetaminophen (NORCO) 7.5-325 MG per tablet TAKE 1 TABLET BY MOUTH EVERY 6 TO 8 HOURS AS NEEDED FOR PAIN  0   • levoFLOXacin (LEVAQUIN) 500 MG tablet Take 1 tablet by mouth Daily. 7 tablet 0   • metroNIDAZOLE (FLAGYL) 250 MG tablet Take 1 tablet by mouth four times a day x 7 days 28 tablet 0   • Misc Natural Products (GLUCOSAMINE CHONDROITIN  "ADV PO) Take  by mouth.     • Omega-3 Fatty Acids (FISH OIL PO) Take  by mouth.     • ondansetron (ZOFRAN) 4 MG tablet Take 1 tablet by mouth Every 8 (Eight) Hours As Needed for Nausea or Vomiting. 30 tablet 1   • Red Yeast Rice Extract (RED YEAST RICE PO) Take  by mouth.     • TURMERIC PO Take  by mouth.     • Unable to find 1 each 1 (One) Time. Med Name:   CBD OIL- 1 squirt       No current facility-administered medications on file prior to visit.       No Known Allergies     The following portions of the patient's history were reviewed and updated as appropriate: allergies, current medications, past family history, past medical history, past social history, past surgical history and problem list.    Review of Systems   Constitutional: Negative.    HENT: Negative.    Eyes: Negative.    Respiratory: Negative.    Cardiovascular: Negative.    Gastrointestinal: Negative.    Endocrine: Negative.    Genitourinary: Negative.    Musculoskeletal: Positive for arthralgias.   Skin: Negative.    Allergic/Immunologic: Negative.    Neurological: Negative.    Hematological: Negative.    Psychiatric/Behavioral: Negative.         Objective      Physical Exam  Pulse 98   Ht 165.1 cm (65\")   Wt 91.1 kg (200 lb 13.4 oz)   LMP  (LMP Unknown)   SpO2 98%   Breastfeeding? No   BMI 33.42 kg/m²     Body mass index is 33.42 kg/m².    GENERAL APPEARANCE: awake, alert & oriented x 3, in no acute distress and well developed, well nourished  PSYCH: normal mood and affect  LUNGS:  breathing nonlabored, no wheezing  EYES: sclera anicteric, pupils equal  CARDIOVASCULAR: palpable pulses dorsalis pedis, palpable posterior tibial bilaterally. Capillary refill less than 2 seconds  INTEGUMENTARY: skin intact, no clubbing, cyanosis  NEUROLOGIC:  Normal Sensation and reflexes       Ortho Exam  Peripheral Vascular:    Upper Extremity:   Inspection:  Left--no cyanotic nail beds Right--no cyanotic nail beds   Bilateral:  Pink nail beds with brisk " capillary refill   Palpation:  Bilateral radial pulse normal    Musculoskeletal:  Global Assessment:  Overall assessment of Lower Extremity Muscle Strength and Tone:  Left quadriceps--5/5   Left hamstrings--5/5       Left tibialis anterior--5/5  Left gastroc-soleus--5/5  Left EHL --5/5    Lower Extremity:  Knee/Patella:  No digital clubbing or cyanosis.    Examination of left knee reveals:  Normal deep tendon reflexes, coordination, strength, tone, sensation.  No known fractures or deformities.    Inspection and Palpation:  Left knee:  Tenderness:  Over the medial joint line and moderate severity  Effusion:  none  Crepitus:  Positive  Pulses:  2+  Ecchymosis:  None  Warmth:  None     ROM:  Right:  Extension:5    Flexion:120  Left:  Extension:5     Flexion:120    Instability:    Left:  Lachman Test:  Negative, Varus stress test negative, Valgus stress test negative    Deformities/Malalignments/Discrepancies:    Left:  Genu Varum   Right:  No deformity    Functional Testing:  Jamal's test:  Negative  Patella grind test:  Positive  Q-angle:  normal    Imaging/Studies  Imaging Results (Last 7 Days)     Procedure Component Value Units Date/Time    XR Knee 4+ View Left [[id number]] Resulted:  11/04/19 0819     Updated:  11/04/19 0821    Narrative:       Knee X-Ray  Indication: Pain    Upright AP of bilateral knees. Lateral, skiers and Sunrise views of left   knee     Findings:medial joint space narrowing  No fracture  No bony lesion  Normal soft tissues    No prior studies were available for comparison.              Assessment/Plan        ICD-10-CM ICD-9-CM   1. Left knee pain, unspecified chronicity M25.562 719.46   2. Internal derangement of left knee M23.92 717.9       Orders Placed This Encounter   Procedures   • XR Knee 4+ View Left   • MRI Knee Left Without Contrast      Her symptoms are consistent with a medial meniscal tear in addition to arthritis.  The plan will be an MRI.  If she has a meniscus tear we will do  arthroscopy.  If she has arthritis we will do a cortisone injection.  I do not want to do a cortisone injection prior to a possible arthroscopy.  Recent studies show that increase the risk of surgical failure  Medical Decision Making  Management Options : over-the-counter medicine  Data/Risk: radiology tests and independent visualization of imaging, lab tests, or EMG/NCV    Bg Sunshine MD  11/04/19  8:27 AM         EMR Dragon/Transcription disclaimer:  Much of this encounter note is an electronic transcription of spoken language to printed text. Electronic transcription of spoken language may permit erroneous, or at times, nonsensical words or phrases to be inadvertently transcribed. Although I have reviewed the note for such errors, some may still exist.

## 2019-11-08 ENCOUNTER — HOSPITAL ENCOUNTER (OUTPATIENT)
Dept: MAMMOGRAPHY | Facility: HOSPITAL | Age: 64
Discharge: HOME | End: 2019-11-08
Admitting: FAMILY MEDICINE

## 2019-11-08 DIAGNOSIS — Z12.31 ENCOUNTER FOR SCREENING MAMMOGRAM FOR MALIGNANT NEOPLASM OF BREAST: ICD-10-CM

## 2019-11-08 PROCEDURE — 77067 SCR MAMMO BI INCL CAD: CPT

## 2019-11-08 PROCEDURE — 77063 BREAST TOMOSYNTHESIS BI: CPT

## 2019-11-11 ENCOUNTER — HOSPITAL ENCOUNTER (OUTPATIENT)
Dept: MRI IMAGING | Facility: HOSPITAL | Age: 64
Discharge: HOME | End: 2019-11-11
Admitting: ORTHOPAEDIC SURGERY

## 2019-11-11 DIAGNOSIS — M23.92 INTERNAL DERANGEMENT OF LEFT KNEE: ICD-10-CM

## 2019-11-11 PROCEDURE — 73721 MRI JNT OF LWR EXTRE W/O DYE: CPT

## 2019-11-25 ENCOUNTER — OFFICE VISIT (OUTPATIENT)
Dept: ORTHOPEDIC SURGERY | Facility: CLINIC | Age: 64
End: 2019-11-25

## 2019-11-25 VITALS — BODY MASS INDEX: 33.46 KG/M2 | HEART RATE: 77 BPM | HEIGHT: 65 IN | WEIGHT: 200.84 LBS | OXYGEN SATURATION: 98 %

## 2019-11-25 DIAGNOSIS — S83.232D COMPLEX TEAR OF MEDIAL MENISCUS OF LEFT KNEE AS CURRENT INJURY, SUBSEQUENT ENCOUNTER: Primary | ICD-10-CM

## 2019-11-25 DIAGNOSIS — M17.12 PRIMARY OSTEOARTHRITIS OF LEFT KNEE: ICD-10-CM

## 2019-11-25 PROCEDURE — 99214 OFFICE O/P EST MOD 30 MIN: CPT | Performed by: ORTHOPAEDIC SURGERY

## 2019-11-25 NOTE — PROGRESS NOTES
[facility]    Subjective     Chief Complaint   Patient presents with   • Left Knee - Follow-up, Pain     Post MRI         HPI  Zuri Love is a 64 y.o. female.  She is follow-up left knee MRI.  She is had pain for 2 months.  She had a knee scope in 2015.  She is tried ibuprofen.  Feels better in a hot tub.  It is dull aching pain.  Symptoms come and go.    Past Medical History:   Diagnosis Date   • Arthritis    • Back pain 2000   • Injury of neck    • Kidney stone 2010   • Psoriasis    • Ulcer    • Vision problems       Past Surgical History:   Procedure Laterality Date   • FACIAL RECONSTRUCTION SURGERY     • HYSTERECTOMY  2001   • KNEE SURGERY Left 2017    arthroscopy   • TONSILLECTOMY        Family History   Problem Relation Age of Onset   • Hypertension Mother    • Diverticulitis Mother    • Hypertension Father    • Diabetes Father      Social History     Socioeconomic History   • Marital status: Single     Spouse name: Not on file   • Number of children: Not on file   • Years of education: Not on file   • Highest education level: Not on file   Tobacco Use   • Smoking status: Never Smoker   • Smokeless tobacco: Never Used   Substance and Sexual Activity   • Alcohol use: Yes     Alcohol/week: 1.2 oz     Types: 1 Glasses of wine, 1 Shots of liquor per week     Comment: occ   • Drug use: No   • Sexual activity: Defer      Current Outpatient Medications on File Prior to Visit   Medication Sig Dispense Refill   • Ascorbic Acid (VITAMIN C PO) Take  by mouth.     • estradiol (ESTRACE) 0.5 MG tablet Take 0.5 mg by mouth Daily.     • levoFLOXacin (LEVAQUIN) 500 MG tablet Take 1 tablet by mouth Daily. 7 tablet 0   • metroNIDAZOLE (FLAGYL) 250 MG tablet Take 1 tablet by mouth four times a day x 7 days 28 tablet 0   • Misc Natural Products (GLUCOSAMINE CHONDROITIN ADV PO) Take  by mouth.     • Omega-3 Fatty Acids (FISH OIL PO) Take  by mouth.     • Red Yeast Rice Extract (RED YEAST  "RICE PO) Take  by mouth.     • TURMERIC PO Take  by mouth.     • Unable to find 1 each 1 (One) Time. Med Name:   CBD OIL- 1 squirt     • [DISCONTINUED] HYDROcodone-acetaminophen (NORCO) 7.5-325 MG per tablet TAKE 1 TABLET BY MOUTH EVERY 6 TO 8 HOURS AS NEEDED FOR PAIN  0   • [DISCONTINUED] ondansetron (ZOFRAN) 4 MG tablet Take 1 tablet by mouth Every 8 (Eight) Hours As Needed for Nausea or Vomiting. 30 tablet 1     No current facility-administered medications on file prior to visit.       No Known Allergies     The following portions of the patient's history were reviewed and updated as appropriate: allergies, current medications, past family history, past medical history, past social history, past surgical history and problem list.    Review of Systems   Constitutional: Negative.    HENT: Negative.    Eyes: Negative.    Respiratory: Negative.    Cardiovascular: Negative.    Gastrointestinal: Negative.    Endocrine: Negative.    Genitourinary: Negative.    Musculoskeletal: Positive for arthralgias and joint swelling.   Skin: Negative.    Allergic/Immunologic: Negative.    Neurological: Negative.    Hematological: Negative.    Psychiatric/Behavioral: Negative.         Objective      Physical Exam  Pulse 77   Ht 165.1 cm (65\")   Wt 91.1 kg (200 lb 13.4 oz)   LMP  (LMP Unknown)   SpO2 98%   Breastfeeding? No   BMI 33.42 kg/m²     Body mass index is 33.42 kg/m².    GENERAL APPEARANCE: awake, alert & oriented x 3, in no acute distress and well developed, well nourished  PSYCH: normal mood and affect  Peripheral Vascular:    Upper Extremity:   Inspection:  Left--no cyanotic nail beds Right--no cyanotic nail beds   Bilateral:  Pink nail beds with brisk capillary refill   Palpation:  Bilateral radial pulse normal    Musculoskeletal:  Global Assessment:  Overall assessment of Lower Extremity Muscle Strength and Tone:  Left quadriceps--5/5   Left hamstrings--5/5       Left tibialis anterior--5/5  Left " gastroc-soleus--5/5  Left EHL --5/5    Lower Extremity:  Knee/Patella:  No digital clubbing or cyanosis.    Examination of left knee reveals:  Normal deep tendon reflexes, coordination, strength, tone, sensation.  No known fractures or deformities.    Inspection and Palpation:  Left knee:  Tenderness:  Over the medial joint line and moderate severity  Effusion:  none  Crepitus:  Positive  Pulses:  2+  Ecchymosis:  None  Warmth:  None     ROM:  Right:  Extension:5    Flexion:120  Left:  Extension:5     Flexion:120    Instability:    Left:  Lachman Test:  Negative, Varus stress test negative, Valgus stress test negative    Deformities/Malalignments/Discrepancies:    Left:  Genu Varum   Right:  No deformity    Functional Testing:  Jamal's test:  Negative  Patella grind test:  Positive  Q-angle:  normal    Imaging/Studies  Imaging Results (Last 7 Days)     ** No results found for the last 168 hours. **        I viewed her knee MRI from November 11 which shows medial meniscus tear and arthritic changes primarily medial compartment  Assessment/Plan        ICD-10-CM ICD-9-CM   1. Complex tear of medial meniscus of left knee as current injury, subsequent encounter S83.232D V58.89   2. Primary osteoarthritis of left knee M17.12 715.16     The plan will be for left knee arthroscopy with partial medial meniscectomy.  She will continue to have the knee arthritis.  She is not yet a candidate for knee arthroplasty.  The knee scope will not help her arthritis.  She will continue ibuprofen for that.Treatment options and alternatives were discussed with patient.  Surgical risks include but are not limited to pain, bleeding, infection, failure to relieve symptoms, need for further procedures, recurrence of symptoms, damage to healthy adjacent structures, hardware loosening/failure, stiffness, weakness, scar, blood clots/DVT/PE, loss of limb or life. We also discussed the postoperative protocol and expected outcome although no  guarantees are possible with surgery. All questions were answered; the patient would like to proceed with surgical intervention.  Medical Decision Making  Management Options : over-the-counter medicine and major surgery with risk factors  Data/Risk: radiology tests and independent visualization of imaging, lab tests, or EMG/NCV    Bg Sunshine MD  11/25/19  9:39 AM         EMR Dragon/Transcription disclaimer:  Much of this encounter note is an electronic transcription of spoken language to printed text. Electronic transcription of spoken language may permit erroneous, or at times, nonsensical words or phrases to be inadvertently transcribed. Although I have reviewed the note for such errors, some may still exist.

## 2020-01-30 ENCOUNTER — TELEPHONE (OUTPATIENT)
Dept: GASTROENTEROLOGY | Facility: CLINIC | Age: 65
End: 2020-01-30

## 2020-02-05 ENCOUNTER — TELEPHONE (OUTPATIENT)
Dept: GASTROENTEROLOGY | Facility: CLINIC | Age: 65
End: 2020-02-05

## 2020-02-05 NOTE — TELEPHONE ENCOUNTER
Called patient, left message for her to call me back.  Per Dr. Adams if patient has no symptoms she can schedule the procedure.

## 2020-02-05 NOTE — TELEPHONE ENCOUNTER
"----- Message from Mony King sent at 1/31/2020 11:13 AM EST -----  Contact: [phone]  The patient left a  for Brittani.  I returned her call.  I offered to schedule a follow-up visit but she adamantly refused, stating that \"$35 is $35\" and she did not want to come in for a visit if she didn't have to.  She stated the only reason for her visit in October was to schedule a colonoscopy.    "

## 2020-02-19 ENCOUNTER — TELEPHONE (OUTPATIENT)
Dept: GASTROENTEROLOGY | Facility: CLINIC | Age: 65
End: 2020-02-19

## 2020-02-19 NOTE — TELEPHONE ENCOUNTER
Attempted to contact patient by phone, to schedule procedure.  Phone rang several times, but no answer, no vm.

## 2020-04-13 ENCOUNTER — TRANSCRIBE ORDERS (OUTPATIENT)
Dept: ADMINISTRATIVE | Facility: HOSPITAL | Age: 65
End: 2020-04-13

## 2020-04-13 DIAGNOSIS — R22.1 NECK MASS: Primary | ICD-10-CM

## 2020-04-27 ENCOUNTER — HOSPITAL ENCOUNTER (OUTPATIENT)
Dept: ULTRASOUND IMAGING | Facility: HOSPITAL | Age: 65
Discharge: HOME | End: 2020-04-27
Admitting: FAMILY MEDICINE

## 2020-04-27 DIAGNOSIS — R22.1 NECK MASS: ICD-10-CM

## 2020-04-27 PROCEDURE — 76536 US EXAM OF HEAD AND NECK: CPT

## 2020-04-28 ENCOUNTER — APPOINTMENT (OUTPATIENT)
Dept: ULTRASOUND IMAGING | Facility: HOSPITAL | Age: 65
End: 2020-04-28

## 2020-05-05 ENCOUNTER — TRANSCRIBE ORDERS (OUTPATIENT)
Dept: ADMINISTRATIVE | Facility: HOSPITAL | Age: 65
End: 2020-05-05

## 2020-05-05 DIAGNOSIS — R59.0 CERVICAL LYMPHADENOPATHY: Primary | ICD-10-CM

## 2020-05-11 ENCOUNTER — HOSPITAL ENCOUNTER (OUTPATIENT)
Dept: PET IMAGING | Facility: HOSPITAL | Age: 65
End: 2020-05-11

## 2020-05-11 ENCOUNTER — TRANSCRIBE ORDERS (OUTPATIENT)
Dept: ADMINISTRATIVE | Facility: HOSPITAL | Age: 65
End: 2020-05-11

## 2020-05-11 DIAGNOSIS — R59.1 LYMPHADENOPATHY: Primary | ICD-10-CM

## 2020-05-15 ENCOUNTER — HOSPITAL ENCOUNTER (OUTPATIENT)
Dept: CT IMAGING | Facility: HOSPITAL | Age: 65
Discharge: HOME | End: 2020-05-15
Admitting: OTOLARYNGOLOGY

## 2020-05-15 ENCOUNTER — APPOINTMENT (OUTPATIENT)
Dept: CT IMAGING | Facility: HOSPITAL | Age: 65
End: 2020-05-15

## 2020-05-15 DIAGNOSIS — R59.1 LYMPHADENOPATHY: ICD-10-CM

## 2020-05-15 LAB — CREAT BLDA-MCNC: 0.6 MG/DL (ref 0.6–1.3)

## 2020-05-15 PROCEDURE — 70491 CT SOFT TISSUE NECK W/DYE: CPT

## 2020-05-15 PROCEDURE — 71260 CT THORAX DX C+: CPT

## 2020-05-15 PROCEDURE — 25010000002 IOPAMIDOL 61 % SOLUTION: Performed by: OTOLARYNGOLOGY

## 2020-05-15 PROCEDURE — 82565 ASSAY OF CREATININE: CPT

## 2020-05-15 RX ADMIN — IOPAMIDOL 95 ML: 612 INJECTION, SOLUTION INTRAVENOUS at 15:07

## 2020-05-19 ENCOUNTER — TRANSCRIBE ORDERS (OUTPATIENT)
Dept: ADMINISTRATIVE | Facility: HOSPITAL | Age: 65
End: 2020-05-19

## 2020-05-19 DIAGNOSIS — E04.1 THYROID NODULE: Primary | ICD-10-CM

## 2020-05-20 ENCOUNTER — TRANSCRIBE ORDERS (OUTPATIENT)
Dept: ULTRASOUND IMAGING | Facility: HOSPITAL | Age: 65
End: 2020-05-20

## 2020-05-20 DIAGNOSIS — K11.8 PAROTID MASS: ICD-10-CM

## 2020-05-20 DIAGNOSIS — E04.1 THYROID NODULE: Primary | ICD-10-CM

## 2020-05-22 ENCOUNTER — HOSPITAL ENCOUNTER (OUTPATIENT)
Dept: ULTRASOUND IMAGING | Facility: HOSPITAL | Age: 65
Discharge: HOME | End: 2020-05-22
Admitting: OTOLARYNGOLOGY

## 2020-05-22 ENCOUNTER — HOSPITAL ENCOUNTER (OUTPATIENT)
Dept: ULTRASOUND IMAGING | Facility: HOSPITAL | Age: 65
Discharge: HOME | End: 2020-05-22

## 2020-05-22 DIAGNOSIS — E04.1 THYROID NODULE: ICD-10-CM

## 2020-05-22 DIAGNOSIS — K11.8 PAROTID MASS: ICD-10-CM

## 2020-05-22 PROCEDURE — 76536 US EXAM OF HEAD AND NECK: CPT

## 2020-06-09 ENCOUNTER — PREP FOR SURGERY (OUTPATIENT)
Dept: OTHER | Facility: HOSPITAL | Age: 65
End: 2020-06-09

## 2020-06-09 DIAGNOSIS — R10.31 RIGHT LOWER QUADRANT ABDOMINAL PAIN: Primary | ICD-10-CM

## 2020-06-09 DIAGNOSIS — Z01.812 PRE-PROCEDURE LAB EXAM: Primary | ICD-10-CM

## 2020-06-09 DIAGNOSIS — Z12.11 COLON CANCER SCREENING: ICD-10-CM

## 2020-06-09 RX ORDER — SODIUM CHLORIDE 9 MG/ML
70 INJECTION, SOLUTION INTRAVENOUS CONTINUOUS PRN
Status: CANCELLED | OUTPATIENT
Start: 2020-06-17

## 2020-06-10 PROBLEM — Z12.11 COLON CANCER SCREENING: Status: ACTIVE | Noted: 2020-06-10

## 2020-06-10 PROBLEM — R10.31 RIGHT LOWER QUADRANT ABDOMINAL PAIN: Status: ACTIVE | Noted: 2020-06-10

## 2020-06-12 ENCOUNTER — OFFICE VISIT (OUTPATIENT)
Dept: PULMONOLOGY | Facility: CLINIC | Age: 65
End: 2020-06-12

## 2020-06-12 VITALS
RESPIRATION RATE: 16 BRPM | WEIGHT: 208 LBS | HEART RATE: 85 BPM | BODY MASS INDEX: 34.66 KG/M2 | OXYGEN SATURATION: 95 % | DIASTOLIC BLOOD PRESSURE: 78 MMHG | SYSTOLIC BLOOD PRESSURE: 132 MMHG | HEIGHT: 65 IN | TEMPERATURE: 98.3 F

## 2020-06-12 DIAGNOSIS — R91.1 LUNG NODULE SEEN ON IMAGING STUDY: Primary | ICD-10-CM

## 2020-06-12 DIAGNOSIS — R22.2 CLAVICULAR AREA FULLNESS: ICD-10-CM

## 2020-06-12 PROCEDURE — 99204 OFFICE O/P NEW MOD 45 MIN: CPT | Performed by: INTERNAL MEDICINE

## 2020-06-12 NOTE — PROGRESS NOTES
New Pulmonary Patient Office Visit      Patient Name: Zuri Love    Referring Physician: Ira Hannah,*    Chief Complaint:    Chief Complaint   Patient presents with   • Consult   • Shortness of Breath       History of Present Illness: Zuri Love is a 64 y.o. female who is here today to establish care with Pulmonary.     She was sent here for work-up of incidentally noted lung nodule.  Started work up due to cervical and supraclavicular swelling and there was concern over possible lymphadenopathy and had CT scan of neck and chest.  No significant lymph node enlargement, but noted but did see 7mm lung nodule.  Never smoker, but had significant smoke exposure as her mother was a heavy smoker.  No family history of lung disease, but mom had thyroid cancer.   She denies any fevers, chills, night sweats, hemoptysis or unexplained weight loss.    She did grow up here in [place].  Never diagnosed with histoplasmosis    She denies any significant shortness of breath except for when her neck pain gets bad.  It improves when her pain is under control.  Has been evaluated by Ortho and they are planning on cervical steroid injection to help with the pain.  States she was told it was related to her arthritis.      She does have chronic dry cough which is been unchanged for many years, but relates this back to her seasonal allergies.    Subjective      Review of Systems:   Review of Systems   Constitutional: Negative for appetite change, chills and fever.   HENT: Negative for nosebleeds, sore throat and voice change.    Eyes: Negative for discharge and visual disturbance.   Respiratory: Negative for cough, shortness of breath and wheezing.    Cardiovascular: Negative for chest pain, palpitations and leg swelling.   Gastrointestinal: Negative for abdominal pain, blood in stool, constipation, diarrhea and GERD.   Endocrine: Negative for cold intolerance and heat intolerance.   Genitourinary:  Negative for difficulty urinating.   Musculoskeletal: Positive for neck pain. Negative for neck stiffness.   Skin: Negative for rash and bruise.   Allergic/Immunologic: Negative for immunocompromised state.   Neurological: Negative for dizziness and weakness.   Hematological: Positive for adenopathy. Does not bruise/bleed easily.   Psychiatric/Behavioral: Negative for suicidal ideas and depressed mood.   All other systems reviewed and are negative.      Past Medical History:   Past Medical History:   Diagnosis Date   • Arthritis    • Back pain 2000   • Cervical lymphadenopathy    • Elevated C-reactive protein (CRP)    • Injury of neck    • Kidney stone 2010   • Neck pain    • Psoriasis    • Ulcer    • Vision problems        Past Surgical History:   Past Surgical History:   Procedure Laterality Date   • FACIAL RECONSTRUCTION SURGERY     • HYSTERECTOMY  2001   • KNEE SURGERY Left 2017    arthroscopy   • TONSILLECTOMY         Family History:   Family History   Problem Relation Age of Onset   • Hypertension Mother    • Diverticulitis Mother    • Hypertension Father    • Diabetes Father        Social History:   Social History     Socioeconomic History   • Marital status: Single     Spouse name: Not on file   • Number of children: Not on file   • Years of education: Not on file   • Highest education level: Not on file   Tobacco Use   • Smoking status: Never Smoker   • Smokeless tobacco: Never Used   Substance and Sexual Activity   • Alcohol use: Yes     Alcohol/week: 2.0 standard drinks     Types: 1 Glasses of wine, 1 Shots of liquor per week     Comment: occ   • Drug use: No   • Sexual activity: Defer       Medications:     Current Outpatient Medications:   •  Ascorbic Acid (VITAMIN C PO), Take  by mouth., Disp: , Rfl:   •  estradiol (ESTRACE) 0.5 MG tablet, Take 0.5 mg by mouth Daily., Disp: , Rfl:   •  Misc Natural Products (GLUCOSAMINE CHONDROITIN ADV PO), Take  by mouth., Disp: , Rfl:   •  Omega-3 Fatty Acids (FISH  "OIL PO), Take  by mouth., Disp: , Rfl:   •  Red Yeast Rice Extract (RED YEAST RICE PO), Take  by mouth., Disp: , Rfl:   •  TURMERIC PO, Take  by mouth., Disp: , Rfl:   •  Unable to find, 1 each 1 (One) Time. Med Name:  CBD OIL- 1 squirt, Disp: , Rfl:   •  levoFLOXacin (LEVAQUIN) 500 MG tablet, Take 1 tablet by mouth Daily., Disp: 7 tablet, Rfl: 0  •  metroNIDAZOLE (FLAGYL) 250 MG tablet, Take 1 tablet by mouth four times a day x 7 days, Disp: 28 tablet, Rfl: 0    Allergies:   No Known Allergies    Objective     Physical Exam:  Vital Signs:   Vitals:    06/12/20 1117   BP: 132/78   Pulse: 85   Resp: 16   Temp: 98.3 °F (36.8 °C)   SpO2: 95%   Weight: 94.3 kg (208 lb)   Height: 165.1 cm (65\")       Physical Exam   Constitutional: She is oriented to person, place, and time. She appears well-developed and well-nourished.   HENT:   Head: Normocephalic and atraumatic.   Mouth/Throat: Oropharynx is clear and moist. No oropharyngeal exudate.   Eyes: EOM are normal. No scleral icterus.   Neck: Normal range of motion. Neck supple.   Supraclavicular fullness bilaterally, but left much greater than right   Cardiovascular: Normal rate and regular rhythm.   No murmur heard.  Pulmonary/Chest: Effort normal and breath sounds normal. No respiratory distress. She has no wheezes.   Abdominal: Soft. She exhibits no distension.   Musculoskeletal: Normal range of motion. She exhibits no edema.   Neurological: She is alert and oriented to person, place, and time.   Skin: Skin is warm. No rash noted.   Psychiatric: She has a normal mood and affect. Her behavior is normal.         Results Review:   I reviewed the patient's new clinical results.    Radiology Scans:   Personally reviewed the following exams:  EXAMINATION: CT CHEST W CONTRAST, CT SOFT TISSUE NECK W CONTRAST -  05/15/2020     INDICATION: Left-sided neck pain and swollen lymph nodes in the left  neck.     R59.1-Generalized enlarged lymph nodes.      TECHNIQUE: Multiple axial CT " imaging is obtained of the neck and chest  following the administration of intravenous contrast.     The radiation dose reduction device was turned on for each scan per the  ALARA (As Low as Reasonably Achievable) protocol.     COMPARISON: None.     FINDINGS:      NECK: Thyroid is homogeneous in appearance. The visualized paranasal  sinuses are clear. Globes and orbits are intact. Mastoid air cells are  patent. Airway is patent. There are small lymph nodes identified in the  anterior and posterior cervical chains bilaterally with no bulky  adenopathy identified. The lymph nodes do not meet CT size criteria to  be considered adenopathy. Nodularity identified within the salivary  glands bilaterally suggesting possible small lymph nodes or adenomas.  Minimal degenerative changes seen within the spine. Small nodules  identified within the thyroid bilaterally. The largest lymph node  identified within the right neck is a jugulodigastric lymph node  measuring 1.5 cm. This lymph node demonstrates a central fatty hilum.  There are small lymph nodes in the supraclavicular region bilaterally.  There is no bulky adenopathy identified. There is no abnormality seen  within the salivary glands. The musculature is intact and unremarkable.     CHEST: No mediastinal mass or adenopathy. No bulky hilar or axillary  lymphadenopathy identified. Cardiac chambers within normal limits. There  is no pericardial effusion. The lung parenchyma reveals no parenchymal  consolidation with small nodule identified anteriorly within the right  upper lobe in the periphery, nonspecific in appearance and measuring 7  mm. Six-month followup is recommended for stability. Findings may  suggest a noncalcified granuloma. The remainder of the lung parenchyma  is grossly clear. Degenerative changes seen within the spine. Old healed  granulomatous disease seen within the chest. No definite pleural  effusion or pneumothorax. No parenchymal consolidation. The  visualized  upper abdomen is unremarkable.     IMPRESSION:  Small lymph nodes normal in size and appearance scattered  throughout the anterior and posterior cervical chains with no bulky  adenopathy identified. No abnormal mass or fluid collection seen within  the neck. There is old healed granulomatous disease seen within the  chest with a 7 mm noncalcified nodule anteriorly within the right upper  lobe for which six-month follow-up is recommended for stability. No  acute intrathoracic abnormality and  no features of pneumonia.    PFT IMPRESSION:    None available for review  Assessment / Plan      Assessment/Plan:    1. Lung nodule seen on imaging study  7 mm lung nodule in low risk patient given lack of smoking history.  Repeat CT scan in 6 months and follow-up afterwards.  Patient advised to call clinic if she develops new symptoms such as hemoptysis, unexplained weight loss, night sweats and/or persistent cough.    2. Clavicular area fullness  Being evaluated by ENT and orthopedics.       Follow Up:   Return in about 6 months (around 12/12/2020).    Sue Brito MD  Pulmonary/Critical Care Physician   Mark      Please note that portions of this note may have been completed with a voice recognition program. Efforts were made to edit the dictations, but occasionally words are mistranscribed.

## 2020-06-15 ENCOUNTER — LAB (OUTPATIENT)
Dept: LAB | Facility: HOSPITAL | Age: 65
End: 2020-06-15

## 2020-06-15 DIAGNOSIS — Z01.812 PRE-PROCEDURE LAB EXAM: ICD-10-CM

## 2020-06-15 PROCEDURE — U0002 COVID-19 LAB TEST NON-CDC: HCPCS

## 2020-06-15 PROCEDURE — C9803 HOPD COVID-19 SPEC COLLECT: HCPCS

## 2020-06-15 PROCEDURE — U0004 COV-19 TEST NON-CDC HGH THRU: HCPCS

## 2020-06-16 LAB
REF LAB TEST METHOD: NORMAL
SARS-COV-2 RNA RESP QL NAA+PROBE: NOT DETECTED

## 2020-06-17 ENCOUNTER — ANESTHESIA EVENT (OUTPATIENT)
Dept: GASTROENTEROLOGY | Facility: HOSPITAL | Age: 65
End: 2020-06-17

## 2020-06-17 ENCOUNTER — HOSPITAL ENCOUNTER (OUTPATIENT)
Facility: HOSPITAL | Age: 65
Setting detail: HOSPITAL OUTPATIENT SURGERY
Discharge: HOME | End: 2020-06-17
Attending: INTERNAL MEDICINE | Admitting: INTERNAL MEDICINE

## 2020-06-17 ENCOUNTER — ANESTHESIA (OUTPATIENT)
Dept: GASTROENTEROLOGY | Facility: HOSPITAL | Age: 65
End: 2020-06-17

## 2020-06-17 VITALS
RESPIRATION RATE: 14 BRPM | SYSTOLIC BLOOD PRESSURE: 136 MMHG | DIASTOLIC BLOOD PRESSURE: 68 MMHG | HEIGHT: 65 IN | OXYGEN SATURATION: 99 % | HEART RATE: 84 BPM | WEIGHT: 200 LBS | TEMPERATURE: 98.1 F | BODY MASS INDEX: 33.32 KG/M2

## 2020-06-17 DIAGNOSIS — Z12.11 COLON CANCER SCREENING: ICD-10-CM

## 2020-06-17 DIAGNOSIS — R10.31 RIGHT LOWER QUADRANT ABDOMINAL PAIN: ICD-10-CM

## 2020-06-17 PROCEDURE — S0260 H&P FOR SURGERY: HCPCS | Performed by: INTERNAL MEDICINE

## 2020-06-17 PROCEDURE — 25010000002 PROPOFOL 10 MG/ML EMULSION: Performed by: NURSE ANESTHETIST, CERTIFIED REGISTERED

## 2020-06-17 PROCEDURE — 45385 COLONOSCOPY W/LESION REMOVAL: CPT | Performed by: INTERNAL MEDICINE

## 2020-06-17 PROCEDURE — 45380 COLONOSCOPY AND BIOPSY: CPT | Performed by: INTERNAL MEDICINE

## 2020-06-17 RX ORDER — LIDOCAINE 50 MG/G
OINTMENT TOPICAL AS NEEDED
Status: DISCONTINUED | OUTPATIENT
Start: 2020-06-17 | End: 2020-06-17 | Stop reason: HOSPADM

## 2020-06-17 RX ORDER — MAGNESIUM HYDROXIDE 1200 MG/15ML
LIQUID ORAL AS NEEDED
Status: DISCONTINUED | OUTPATIENT
Start: 2020-06-17 | End: 2020-06-17 | Stop reason: HOSPADM

## 2020-06-17 RX ORDER — SODIUM CHLORIDE 0.9 % (FLUSH) 0.9 %
10 SYRINGE (ML) INJECTION AS NEEDED
Status: DISCONTINUED | OUTPATIENT
Start: 2020-06-17 | End: 2020-06-17 | Stop reason: HOSPADM

## 2020-06-17 RX ORDER — SIMETHICONE 20 MG/.3ML
EMULSION ORAL AS NEEDED
Status: DISCONTINUED | OUTPATIENT
Start: 2020-06-17 | End: 2020-06-17 | Stop reason: HOSPADM

## 2020-06-17 RX ORDER — PROPOFOL 10 MG/ML
VIAL (ML) INTRAVENOUS AS NEEDED
Status: DISCONTINUED | OUTPATIENT
Start: 2020-06-17 | End: 2020-06-17 | Stop reason: SURG

## 2020-06-17 RX ORDER — SODIUM CHLORIDE 9 MG/ML
70 INJECTION, SOLUTION INTRAVENOUS CONTINUOUS PRN
Status: DISCONTINUED | OUTPATIENT
Start: 2020-06-17 | End: 2020-06-17 | Stop reason: HOSPADM

## 2020-06-17 RX ADMIN — PROPOFOL 100 MG: 10 INJECTION, EMULSION INTRAVENOUS at 11:53

## 2020-06-17 RX ADMIN — SODIUM CHLORIDE 70 ML/HR: 9 INJECTION, SOLUTION INTRAVENOUS at 11:03

## 2020-06-17 RX ADMIN — PROPOFOL 120 MCG/KG/MIN: 10 INJECTION, EMULSION INTRAVENOUS at 11:53

## 2020-06-17 RX ADMIN — GLYCOPYRROLATE 0.4 MG: 0.2 INJECTION, SOLUTION INTRAMUSCULAR; INTRAVENOUS at 12:23

## 2020-06-17 NOTE — ANESTHESIA PREPROCEDURE EVALUATION
Anesthesia Evaluation     Patient summary reviewed and Nursing notes reviewed   no history of anesthetic complications:  NPO Solid Status: > 8 hours  NPO Liquid Status: > 8 hours           Airway   Mallampati: I  TM distance: >3 FB  Neck ROM: full  no difficulty expected  Dental - normal exam     Pulmonary - normal exam   Cardiovascular - normal exam    Rhythm: regular  Rate: normal        Neuro/Psych  GI/Hepatic/Renal/Endo    (+)  GERD,  renal disease stones,     Musculoskeletal     (+) back pain, chronic pain, neck pain,   Abdominal  - normal exam    Abdomen: soft.  Bowel sounds: normal.   Substance History   (+) alcohol use,      OB/GYN          Other   arthritis,                      Anesthesia Plan    ASA 2     MAC   (Risks and benefits discussed including risk of aspiration, recall and dental damage. All patient questions answered. Will continue with POC.)  intravenous induction     Anesthetic plan, all risks, benefits, and alternatives have been provided, discussed and informed consent has been obtained with: patient.

## 2020-06-17 NOTE — ANESTHESIA POSTPROCEDURE EVALUATION
Patient: Zuri Love    Procedure Summary     Date:  06/17/20 Room / Location:  [facility] ENDOSCOPY 2 / [facility] ENDOSCOPY    Anesthesia Start:  1150 Anesthesia Stop:      Procedure:  COLONOSCOPY (N/A Anus) Diagnosis:       Right lower quadrant abdominal pain      Colon cancer screening      (Right lower quadrant abdominal pain [R10.31])      (Colon cancer screening [Z12.11])    Surgeon:  Miguel A Adams MD Provider:  Manish Candelaria CRNA    Anesthesia Type:  MAC ASA Status:  2          Anesthesia Type: MAC    Vitals  HR 82  Sat 96  Resp 20  Temp 98  /88          Post Anesthesia Care and Evaluation    Patient location during evaluation: bedside  Patient participation: complete - patient participated  Level of consciousness: awake and alert  Pain score: 0  Pain management: adequate  Airway patency: patent  Anesthetic complications: No anesthetic complications  PONV Status: none  Cardiovascular status: acceptable  Respiratory status: acceptable and nasal cannula  Hydration status: acceptable

## 2020-06-17 NOTE — OP NOTE
PROCEDURE:  Colonoscopy to the terminal ileum with 1 cold snare and one cold biopsy polypectomies.     DATE OF PROCEDURE:  June 17, 2020    REFERRING PROVIDER:  Mary Romo MD     INSTRUMENT USED:  Olympus PCF H 190 videocolonoscope.     INDICATIONS OF THE PROCEDURE: This is a 64-year-old white female for colon cancer screening.  There is history of diverticulitis for which she has been treated and resolved.    PREVIOUS COLONOSCOPY: 2013.  Colon polyps.    BIOPSIES: A cold snare polypectomy was performed at the cecum and one cold biopsy polypectomy was performed in the rectum.      PHOTOGRAPHS:  Photographs were included in the medical records.     MEDICATIONS:  MAC.       CONSENT/PREPROCEDURE EVALUATION:  Risks, benefits, alternatives and options of the procedure including risks of sedation/anesthesia were discussed with the patient and informed consent was obtained prior to the procedure.  History and physical examination were performed and nothing precluded the test.      REPORT:  The patient was placed in left lateral decubitus position and a digital examination was performed.  Once under the influence of IV sedation, the instrument was inserted into the rectum and advanced under direct vision to cecum which was identified by the ileocecal valve, triradiate folds and appendiceal orifice. The scope was then maneuvered into the terminal ileum.        FINDINGS:      Digital rectal examination:  Good anal tone.  No perianal pathology.  No mass.        Terminal ileum:  7-8 cm.  Normal.     Cecum and ascending colon: A 5 mm sessile polyp in the cecum was removed with cold snare.       Hepatic flexure, transverse colon, splenic flexure:  Normal.         Descending colon, sigmoid colon and rectum: Scant left-sided diverticulosis.  3 mm sessile polyp in the rectum was removed with cold biopsy forceps.  A retroflex examination within the rectum revealed internal hemorrhoids.        The scope was then straightened,  the lower GI tract was decompressed, and the scope was pulled out of the patient.  The patient tolerated the procedure well.  There were no immediate complications and the patient was transferred in stable condition for post procedure observation.      TECHNICAL DATA:   1. Seneca prep score: 8 (3+2+3).    2. Anus to cecal time:  5 minutes.  3. Difficulty of examination:  Average.    4. Withdrawal time: 22 minutes.  5. Procedure time: 36 minutes  6. Retroflex examination in right colon: Yes.    7. Second look Rectum to cecum with decompression: Yes.    DIAGNOSES:    1. Scant left-sided diverticulosis.  2. Colon polyps.  2 were removed.  3-5 mm in size.  3. Internal hemorrhoids.    RECOMMENDATIONS:     1. Dietary instructions.  2. Follow biopsies.    3. Follow-up: Call in 1 week regarding biopsy results.  However if desires to follow-up in the office call for follow-up appointment in 2 to 3 weeks.  4. Followup colonoscopy:  5 years.          Thank you very much for letting me participate in the care of this patient. Please do not hesitate to call me if you have any questions.

## 2020-06-17 NOTE — H&P
Chief complaint:  Abdominal pain, Colon CA Screen    History of present illness:  Colon Cancer Screen, Last Colonoscopy 2013, Hx Colon polyps, H/O RLQ Abdominal pain, Treated for Diverticulitis (October 2019), Heartburn, Nausea      Past medical history:   Past Medical History:   Diagnosis Date   • Arthritis    • Back pain 2000   • Body piercing     both ears   • Cervical lymphadenopathy    • Elevated C-reactive protein (CRP)    • GERD (gastroesophageal reflux disease)    • Injury of neck    • Kidney stone 2010   • Neck pain    • Psoriasis    • Ulcer    • Wears contact lenses    • Wears glasses        Surgical history:    Past Surgical History:   Procedure Laterality Date   • ANAL FISTULA REPAIR     • COLONOSCOPY  2013   • ENDOSCOPY     • FACIAL RECONSTRUCTION SURGERY     • HYSTERECTOMY  2001   • KNEE SURGERY Left 2017    arthroscopy   • TONSILLECTOMY     • WISDOM TOOTH EXTRACTION         Social history:  Social History     Socioeconomic History   • Marital status: Single     Spouse name: Not on file   • Number of children: Not on file   • Years of education: Not on file   • Highest education level: Not on file   Tobacco Use   • Smoking status: Never Smoker   • Smokeless tobacco: Never Used   Substance and Sexual Activity   • Alcohol use: Yes     Alcohol/week: 2.0 standard drinks     Types: 1 Glasses of wine, 1 Shots of liquor per week     Comment: occ   • Drug use: No   • Sexual activity: Defer       Allergies:  Patient has no known allergies.  Latex allergy: None  Contrast allergy: None    Medications:  Medications Prior to Admission   Medication Sig Dispense Refill Last Dose   • Ascorbic Acid (VITAMIN C PO) Take 500 mg by mouth Daily.   Past Week at Unknown time   • estradiol (ESTRACE) 0.5 MG tablet Take 0.5 mg by mouth Daily.   6/16/2020 at 0900   • Misc Natural Products (GLUCOSAMINE CHONDROITIN ADV PO) Take 1 capsule by mouth Daily.   Past Week at Unknown time   • Omega-3 Fatty Acids (FISH OIL PO) Take 1  "capsule by mouth Daily.   Past Week at Unknown time   • Red Yeast Rice Extract (RED YEAST RICE PO) Take 1 capsule by mouth Daily.   Past Week at Unknown time   • TURMERIC PO Take 1 capsule by mouth Daily.   Past Week at Unknown time   • Unable to find 1 each 1 (One) Time. Med Name:   CBD OIL- 1 squirt   Taking       Review of systems:   Constitutional: No recent: Fever, Weight loss  Respiratory: No recent: SOB, Cough  Cardiovascular: No recent: Chest Pains, congestive heart failure or arrhythmias.   Neurological: No recent: Seizures, CVA, TIA.   Genitourinary: No recent: Renal Failure, UTI.  Endocrine: No recent: Worsening of diabetes or thyroid disease.  Musculoskeletal:   No recent: Joint swelling.  Hem. Oncology: No recent: Anemia or bleeding.  Psychiatric: No recent: Worsening of depression or anxiety.     VITAL SIGNS:    Blood pressure 130/75, pulse 72, temperature 98 °F (36.7 °C), temperature source Infrared, resp. rate 17, height 165.1 cm (65\"), weight 90.7 kg (200 lb), SpO2 97 %, not currently breastfeeding.    PHYSICAL EXAMINATION:   HEENT: Normal.   Lungs: Clear to auscultation.  Heart: No S3, no murmur.    Abdomen: Soft.  BS+ ND, NT  Extremities: No edema.  No cyanosis.  Neuro: Alert X 3. No focal deficit.    Assessment: Colon Cancer Screen, Last Colonoscopy 2013, Hx Colon polyps, H/O RLQ Abdominal pain, Treated for Diverticulitis (October 2019)    Plan:  Colonoscopy      Risks/Benefits:  The potential benefits, risk and/or side effects of the procedure and alternatives have been discussed with the patient/authorized representative and questions were answered.    "

## 2020-06-17 NOTE — DISCHARGE INSTRUCTIONS
************************************************************************************    Postprocedure instructions:    1. Nothing by mouth to fully alert.  2. Once fully alert may have clear liquid diet.  3. Advance diet as tolerated.  4. Vital signs as routine.    Diet:     1. Low-fat diet.  2. [place] All Bran- Buds 1/3 cup daily.  3. May add Honey Bunches of Oats with Almonds 1/4 cup for taste.  4. Use fair life milk, almond milk, 1 or 2% milk.  5. Drink liberal amounts of water.    Blood Thinner Directions:    Avoid Aspirin & other NSAIDS for _7__ days. Tylenol is okay.    Treatments:      Other Instructions:    Call [facility] at [phone] or come to the Emergency Department if you experience the following: Chest pain, abdominal pain, bleeding (vomiting of blood or coffee colored material, black stools or viraj blood in stools), fever/chills, nausea and vomiting or dizziness.      Follow-up: Call the office in 1 week regarding biopsy results.  If desired to follow-up in person please call the office for a follow-up appointment in 2 to 3 weeks.  DR. EVERARDO ADAMS.Office phone # [phone].    Follow-up colonoscopy: in 5 years.    ************************************************************************************    Notes to the patient and the family from Dr. Adams.    Dear patient/family member,    Today your colon was examined extensively from rectum to cecum and beyond into the small intestine twice.   Findings on today's procedure are as follows:    1. Diverticulosis. These are benign outpouchings in the colon.   2. Colon polyps. 2 were found and removed.   3. No cancer. No inflammation or colitis. No suggestion of Crohn's disease.  4. Internal hemorrhoids.    Recommendations:    1. As above.  2. Use CORTIZONE 10 OINTMENT (hydrocortisone 1% ointment) over the counter. AVOID CREAM OR GEL.  Apply anorectally  2-3 times a day for 1 week.  May need to use a liner to protect the  garments.  3. SITZ bath 3 times a day.  20 minutes each time for 2 days.      Should you have more questions please do not hesitate to talk to the nurse who can call me and let me talk to you.      I hope you feel better.    Miguel A Adams M.D., FACP, FACG.

## 2020-06-18 LAB
LAB AP CASE REPORT: NORMAL
PATH REPORT.FINAL DX SPEC: NORMAL

## 2020-11-05 ENCOUNTER — HOSPITAL ENCOUNTER (EMERGENCY)
Facility: HOSPITAL | Age: 65
Discharge: HOME | End: 2020-11-05
Attending: EMERGENCY MEDICINE | Admitting: EMERGENCY MEDICINE

## 2020-11-05 ENCOUNTER — APPOINTMENT (OUTPATIENT)
Dept: CT IMAGING | Facility: HOSPITAL | Age: 65
End: 2020-11-05

## 2020-11-05 VITALS
OXYGEN SATURATION: 97 % | DIASTOLIC BLOOD PRESSURE: 89 MMHG | SYSTOLIC BLOOD PRESSURE: 149 MMHG | HEART RATE: 78 BPM | WEIGHT: 197.8 LBS | BODY MASS INDEX: 32.96 KG/M2 | TEMPERATURE: 98.4 F | HEIGHT: 65 IN | RESPIRATION RATE: 20 BRPM

## 2020-11-05 DIAGNOSIS — R11.2 NAUSEA AND VOMITING, INTRACTABILITY OF VOMITING NOT SPECIFIED, UNSPECIFIED VOMITING TYPE: ICD-10-CM

## 2020-11-05 DIAGNOSIS — R10.10 UPPER ABDOMINAL PAIN: Primary | ICD-10-CM

## 2020-11-05 LAB
ALBUMIN SERPL-MCNC: 4.5 G/DL (ref 3.5–5.2)
ALBUMIN/GLOB SERPL: 1.7 G/DL
ALP SERPL-CCNC: 60 U/L (ref 39–117)
ALT SERPL W P-5'-P-CCNC: 23 U/L (ref 1–33)
ANION GAP SERPL CALCULATED.3IONS-SCNC: 9.4 MMOL/L (ref 5–15)
AST SERPL-CCNC: 21 U/L (ref 1–32)
BACTERIA UR QL AUTO: ABNORMAL /HPF
BASOPHILS # BLD AUTO: 0.04 10*3/MM3 (ref 0–0.2)
BASOPHILS NFR BLD AUTO: 0.4 % (ref 0–1.5)
BILIRUB SERPL-MCNC: 1 MG/DL (ref 0–1.2)
BILIRUB UR QL STRIP: NEGATIVE
BUN SERPL-MCNC: 13 MG/DL (ref 8–23)
BUN/CREAT SERPL: 23.2 (ref 7–25)
CALCIUM SPEC-SCNC: 9.4 MG/DL (ref 8.6–10.5)
CHLORIDE SERPL-SCNC: 103 MMOL/L (ref 98–107)
CLARITY UR: ABNORMAL
CO2 SERPL-SCNC: 27.6 MMOL/L (ref 22–29)
COLOR UR: YELLOW
CREAT SERPL-MCNC: 0.56 MG/DL (ref 0.57–1)
DEPRECATED RDW RBC AUTO: 40.7 FL (ref 37–54)
EOSINOPHIL # BLD AUTO: 0.08 10*3/MM3 (ref 0–0.4)
EOSINOPHIL NFR BLD AUTO: 0.7 % (ref 0.3–6.2)
ERYTHROCYTE [DISTWIDTH] IN BLOOD BY AUTOMATED COUNT: 12.3 % (ref 12.3–15.4)
GFR SERPL CREATININE-BSD FRML MDRD: 109 ML/MIN/1.73
GLOBULIN UR ELPH-MCNC: 2.6 GM/DL
GLUCOSE SERPL-MCNC: 132 MG/DL (ref 65–99)
GLUCOSE UR STRIP-MCNC: NEGATIVE MG/DL
HCT VFR BLD AUTO: 44.1 % (ref 34–46.6)
HGB BLD-MCNC: 14.8 G/DL (ref 12–15.9)
HGB UR QL STRIP.AUTO: ABNORMAL
HOLD SPECIMEN: NORMAL
HOLD SPECIMEN: NORMAL
HYALINE CASTS UR QL AUTO: ABNORMAL /LPF
IMM GRANULOCYTES # BLD AUTO: 0.05 10*3/MM3 (ref 0–0.05)
IMM GRANULOCYTES NFR BLD AUTO: 0.5 % (ref 0–0.5)
KETONES UR QL STRIP: NEGATIVE
LEUKOCYTE ESTERASE UR QL STRIP.AUTO: NEGATIVE
LIPASE SERPL-CCNC: 20 U/L (ref 13–60)
LYMPHOCYTES # BLD AUTO: 1.32 10*3/MM3 (ref 0.7–3.1)
LYMPHOCYTES NFR BLD AUTO: 11.9 % (ref 19.6–45.3)
MCH RBC QN AUTO: 30.5 PG (ref 26.6–33)
MCHC RBC AUTO-ENTMCNC: 33.6 G/DL (ref 31.5–35.7)
MCV RBC AUTO: 90.7 FL (ref 79–97)
MONOCYTES # BLD AUTO: 0.57 10*3/MM3 (ref 0.1–0.9)
MONOCYTES NFR BLD AUTO: 5.1 % (ref 5–12)
MUCOUS THREADS URNS QL MICRO: ABNORMAL /HPF
NEUTROPHILS NFR BLD AUTO: 81.4 % (ref 42.7–76)
NEUTROPHILS NFR BLD AUTO: 9.05 10*3/MM3 (ref 1.7–7)
NITRITE UR QL STRIP: NEGATIVE
NRBC BLD AUTO-RTO: 0 /100 WBC (ref 0–0.2)
PH UR STRIP.AUTO: 5.5 [PH] (ref 5–8)
PLATELET # BLD AUTO: 184 10*3/MM3 (ref 140–450)
PMV BLD AUTO: 10.3 FL (ref 6–12)
POTASSIUM SERPL-SCNC: 4.1 MMOL/L (ref 3.5–5.2)
PROT SERPL-MCNC: 7.1 G/DL (ref 6–8.5)
PROT UR QL STRIP: ABNORMAL
RBC # BLD AUTO: 4.86 10*6/MM3 (ref 3.77–5.28)
RBC # UR: ABNORMAL /HPF
REF LAB TEST METHOD: ABNORMAL
SODIUM SERPL-SCNC: 140 MMOL/L (ref 136–145)
SP GR UR STRIP: 1.02 (ref 1–1.03)
SQUAMOUS #/AREA URNS HPF: ABNORMAL /HPF
UROBILINOGEN UR QL STRIP: ABNORMAL
WBC # BLD AUTO: 11.11 10*3/MM3 (ref 3.4–10.8)
WBC UR QL AUTO: ABNORMAL /HPF
WHOLE BLOOD HOLD SPECIMEN: NORMAL
WHOLE BLOOD HOLD SPECIMEN: NORMAL

## 2020-11-05 PROCEDURE — 25010000002 IOPAMIDOL 61 % SOLUTION: Performed by: EMERGENCY MEDICINE

## 2020-11-05 PROCEDURE — 83690 ASSAY OF LIPASE: CPT | Performed by: EMERGENCY MEDICINE

## 2020-11-05 PROCEDURE — 25010000002 KETOROLAC TROMETHAMINE PER 15 MG: Performed by: EMERGENCY MEDICINE

## 2020-11-05 PROCEDURE — 74177 CT ABD & PELVIS W/CONTRAST: CPT

## 2020-11-05 PROCEDURE — 96374 THER/PROPH/DIAG INJ IV PUSH: CPT

## 2020-11-05 PROCEDURE — 99283 EMERGENCY DEPT VISIT LOW MDM: CPT

## 2020-11-05 PROCEDURE — 96375 TX/PRO/DX INJ NEW DRUG ADDON: CPT

## 2020-11-05 PROCEDURE — 85025 COMPLETE CBC W/AUTO DIFF WBC: CPT | Performed by: EMERGENCY MEDICINE

## 2020-11-05 PROCEDURE — 80053 COMPREHEN METABOLIC PANEL: CPT | Performed by: EMERGENCY MEDICINE

## 2020-11-05 PROCEDURE — 25010000002 ONDANSETRON PER 1 MG: Performed by: EMERGENCY MEDICINE

## 2020-11-05 PROCEDURE — 81001 URINALYSIS AUTO W/SCOPE: CPT | Performed by: EMERGENCY MEDICINE

## 2020-11-05 RX ORDER — DICYCLOMINE HCL 20 MG
20 TABLET ORAL EVERY 6 HOURS PRN
Qty: 20 TABLET | Refills: 0 | Status: SHIPPED | OUTPATIENT
Start: 2020-11-05 | End: 2021-02-02

## 2020-11-05 RX ORDER — SODIUM CHLORIDE 0.9 % (FLUSH) 0.9 %
10 SYRINGE (ML) INJECTION AS NEEDED
Status: DISCONTINUED | OUTPATIENT
Start: 2020-11-05 | End: 2020-11-05 | Stop reason: HOSPADM

## 2020-11-05 RX ORDER — KETOROLAC TROMETHAMINE 30 MG/ML
30 INJECTION, SOLUTION INTRAMUSCULAR; INTRAVENOUS ONCE
Status: COMPLETED | OUTPATIENT
Start: 2020-11-05 | End: 2020-11-05

## 2020-11-05 RX ORDER — ONDANSETRON 2 MG/ML
4 INJECTION INTRAMUSCULAR; INTRAVENOUS ONCE
Status: COMPLETED | OUTPATIENT
Start: 2020-11-05 | End: 2020-11-05

## 2020-11-05 RX ORDER — DICYCLOMINE HYDROCHLORIDE 10 MG/1
20 CAPSULE ORAL ONCE
Status: COMPLETED | OUTPATIENT
Start: 2020-11-05 | End: 2020-11-05

## 2020-11-05 RX ORDER — HYDROCODONE BITARTRATE AND ACETAMINOPHEN 5; 325 MG/1; MG/1
1 TABLET ORAL EVERY 6 HOURS PRN
COMMUNITY
End: 2021-10-04

## 2020-11-05 RX ORDER — ONDANSETRON 4 MG/1
4 TABLET, ORALLY DISINTEGRATING ORAL EVERY 8 HOURS PRN
Qty: 12 TABLET | Refills: 0 | Status: SHIPPED | OUTPATIENT
Start: 2020-11-05 | End: 2021-10-04

## 2020-11-05 RX ADMIN — IOPAMIDOL 100 ML: 612 INJECTION, SOLUTION INTRAVENOUS at 10:23

## 2020-11-05 RX ADMIN — ONDANSETRON 4 MG: 2 INJECTION INTRAMUSCULAR; INTRAVENOUS at 09:28

## 2020-11-05 RX ADMIN — DICYCLOMINE HYDROCHLORIDE 20 MG: 10 CAPSULE ORAL at 12:18

## 2020-11-05 RX ADMIN — KETOROLAC TROMETHAMINE 30 MG: 30 INJECTION, SOLUTION INTRAMUSCULAR; INTRAVENOUS at 09:29

## 2020-11-05 NOTE — ED PROVIDER NOTES
Subjective   History of Present Illness    Chief Complaint: Abdominal pain vomiting constipation  History of Present Illness: 65-year-old female presents with abdominal pain and cramping.  Constipation yesterday with over-the-counter treatment to normal bowel movements this morning.  Vomited once this morning.  Persistent pain and nausea.  Prior history of peptic ulcer disease and a hysterectomy.  Onset: Yesterday  Duration: Persistent  Exacerbating / Alleviating factors: Attempted over-the-counter medications without relief  Associated symptoms: None      Nurses Notes reviewed and agree, including vitals, allergies, social history and prior medical history.     REVIEW OF SYSTEMS: All systems reviewed and not pertinent unless noted.    Positive for: Abdominal pain vomiting constipation    Negative for: GI bleeding abdominal distention flank pain fever  Review of Systems    Past Medical History:   Diagnosis Date   • Arthritis    • Back pain 2000   • Body piercing     both ears   • Cervical lymphadenopathy    • Elevated C-reactive protein (CRP)    • GERD (gastroesophageal reflux disease)    • Injury of neck    • Kidney stone 2010   • Neck pain    • Psoriasis    • Ulcer    • Wears contact lenses    • Wears glasses        No Known Allergies    Past Surgical History:   Procedure Laterality Date   • ANAL FISTULA REPAIR     • COLONOSCOPY  2013   • COLONOSCOPY N/A 6/17/2020    Procedure: COLONOSCOPY;  Surgeon: Miguel A Adams MD;  Location: [facility] ENDOSCOPY;  Service: Gastroenterology;  Laterality: N/A;   • ENDOSCOPY     • FACIAL RECONSTRUCTION SURGERY     • HYSTERECTOMY  2001   • KNEE SURGERY Left 2017    arthroscopy   • TONSILLECTOMY     • WISDOM TOOTH EXTRACTION         Family History   Problem Relation Age of Onset   • Hypertension Mother    • Diverticulitis Mother    • Hypertension Father    • Diabetes Father        Social History     Socioeconomic History   • Marital status: Single     Spouse name: Not on file   •  Number of children: Not on file   • Years of education: Not on file   • Highest education level: Not on file   Tobacco Use   • Smoking status: Never Smoker   • Smokeless tobacco: Never Used   Substance and Sexual Activity   • Alcohol use: Yes     Alcohol/week: 2.0 standard drinks     Types: 1 Glasses of wine, 1 Shots of liquor per week     Comment: occ   • Drug use: No   • Sexual activity: Defer           Objective   Physical Exam    GENERAL APPEARANCE: Well developed, obese 65-year-old white female,  in moderate discomfort.  VITAL SIGNS: per nursing, reviewed and noted  SKIN: exposed skin with no rashes, ulcerations or petechiae.  Head: Normocephalic, atraumatic.   EYES: perrla. EOMI.  ENT: Normal voice.  Patient maintained wearing a mask throughout patient encounter due to coronavirus pandemic  LUNGS:  No increased work of breathing. No retractions.   CARDIOVASCULAR:  regular rate and rhythm, no murmurs.  Good Peripheral pulses. Good cap refill to extremities.   ABDOMEN: Soft, upper abdominal tenderness diffusely without real rebound, no guarding, normal bowel sounds. No hernia. No ascites.  MUSCULOSKELETAL:  No tenderness. Full ROM. Strength and tone normal.  NEUROLOGIC: Alert, oriented x 3. No gross deficits. GCS 15.   NECK: Supple, symmetric. No tenderness, no masses. Full ROM  Back: full rom, no paraspinal spasm. No CVA tenderness.   PSYCH: appropriate affect.  : no bladder tenderness or distention, no CVA tenderness      Procedures     No attending physician procedures were performed on this patient.      ED Course  ED Course as of Nov 05 1202   Thu Nov 05, 2020   1131 FINDINGS:      ABDOMEN: The lung bases are clear. The heart is normal in size. The  liver is fatty infiltrated. The spleen is unremarkable. No adrenal mass  is present.  The pancreas is normal. The kidneys are normal. The aorta  is normal in caliber. There is no free fluid or adenopathy. There are  fluid-filled, dilated loops of small bowel  distally with adjacent  mesenteric edema likely due to infectious or inflammatory enteritis.  There is no bowel obstruction. There is no free air.     PELVIS: The appendix is not identified. The uterus is surgically absent  The urinary bladder is unremarkable. There is no significant free fluid  or adenopathy.     IMPRESSION:  Fluid-filled, dilated loops of distal small bowel with  adjacent mesenteric edema. Findings are likely due to infectious or  inflammatory enteritis.     1099.39 mGy.cm        This study was performed with techniques to keep radiation doses as low  as reasonably achievable (ALARA). Individualized dose reduction  techniques using automated exposure control or adjustment of mA and/or  kV according to the patient size were employed.                  Images were reviewed, interpreted, and dictated by Dr. Macie Franco M.D.  Transcribed by Lexus Figueroa PA-C.    [PF]   1136 WBC(!): 11.11 [PF]      ED Course User Index  [PF] Rick Chew, DO                                           MDM  65-year-old female presents with upper abdominal pain, vomiting, constipation, CT scan of the abdomen pelvis reveals infectious versus inflammatory enteritis.  No acute surgical findings on CT scan or exam.  CBC CMP lipase and urinalysis are benign except for a white count of 11.11. patient symptomatically improved with Toradol and Zofran.  Will discharge with Zofran and Bentyl.  Advised as needed Imodium if diarrhea occurs.  Advised outpatient follow-up and strict return precautions were discussed.  Final diagnoses:   Upper abdominal pain   Nausea and vomiting, intractability of vomiting not specified, unspecified vomiting type            Rick Chew,   11/05/20 1202

## 2020-12-03 ENCOUNTER — LAB (OUTPATIENT)
Dept: LAB | Facility: HOSPITAL | Age: 65
End: 2020-12-03

## 2020-12-03 ENCOUNTER — TRANSCRIBE ORDERS (OUTPATIENT)
Dept: ADMINISTRATIVE | Facility: HOSPITAL | Age: 65
End: 2020-12-03

## 2020-12-03 DIAGNOSIS — R20.2 PARESTHESIA: ICD-10-CM

## 2020-12-03 DIAGNOSIS — R73.01 IMPAIRED FASTING GLUCOSE: ICD-10-CM

## 2020-12-03 DIAGNOSIS — R73.01 IMPAIRED FASTING GLUCOSE: Primary | ICD-10-CM

## 2020-12-03 LAB
ALBUMIN SERPL-MCNC: 4 G/DL (ref 3.5–5.2)
ALBUMIN/GLOB SERPL: 1.3 G/DL
ALP SERPL-CCNC: 51 U/L (ref 39–117)
ALT SERPL W P-5'-P-CCNC: 19 U/L (ref 1–33)
ANION GAP SERPL CALCULATED.3IONS-SCNC: 7.7 MMOL/L (ref 5–15)
AST SERPL-CCNC: 18 U/L (ref 1–32)
BASOPHILS # BLD AUTO: 0.04 10*3/MM3 (ref 0–0.2)
BASOPHILS NFR BLD AUTO: 0.7 % (ref 0–1.5)
BILIRUB SERPL-MCNC: 0.7 MG/DL (ref 0–1.2)
BUN SERPL-MCNC: 12 MG/DL (ref 8–23)
BUN/CREAT SERPL: 18.8 (ref 7–25)
CALCIUM SPEC-SCNC: 9.1 MG/DL (ref 8.6–10.5)
CHLORIDE SERPL-SCNC: 106 MMOL/L (ref 98–107)
CHOLEST SERPL-MCNC: 230 MG/DL (ref 0–200)
CO2 SERPL-SCNC: 27.3 MMOL/L (ref 22–29)
CREAT SERPL-MCNC: 0.64 MG/DL (ref 0.57–1)
DEPRECATED RDW RBC AUTO: 42.3 FL (ref 37–54)
EOSINOPHIL # BLD AUTO: 0.13 10*3/MM3 (ref 0–0.4)
EOSINOPHIL NFR BLD AUTO: 2.1 % (ref 0.3–6.2)
ERYTHROCYTE [DISTWIDTH] IN BLOOD BY AUTOMATED COUNT: 13 % (ref 12.3–15.4)
FOLATE SERPL-MCNC: 16.3 NG/ML (ref 4.78–24.2)
GFR SERPL CREATININE-BSD FRML MDRD: 93 ML/MIN/1.73
GLOBULIN UR ELPH-MCNC: 3.2 GM/DL
GLUCOSE SERPL-MCNC: 96 MG/DL (ref 65–99)
HBA1C MFR BLD: 5.56 % (ref 4.8–5.6)
HCT VFR BLD AUTO: 41.9 % (ref 34–46.6)
HDLC SERPL-MCNC: 73 MG/DL (ref 40–60)
HGB BLD-MCNC: 14.2 G/DL (ref 12–15.9)
IMM GRANULOCYTES # BLD AUTO: 0.05 10*3/MM3 (ref 0–0.05)
IMM GRANULOCYTES NFR BLD AUTO: 0.8 % (ref 0–0.5)
LDLC SERPL CALC-MCNC: 138 MG/DL (ref 0–100)
LDLC/HDLC SERPL: 1.85 {RATIO}
LYMPHOCYTES # BLD AUTO: 1.56 10*3/MM3 (ref 0.7–3.1)
LYMPHOCYTES NFR BLD AUTO: 25.4 % (ref 19.6–45.3)
MCH RBC QN AUTO: 30.7 PG (ref 26.6–33)
MCHC RBC AUTO-ENTMCNC: 33.9 G/DL (ref 31.5–35.7)
MCV RBC AUTO: 90.7 FL (ref 79–97)
MONOCYTES # BLD AUTO: 0.5 10*3/MM3 (ref 0.1–0.9)
MONOCYTES NFR BLD AUTO: 8.2 % (ref 5–12)
NEUTROPHILS NFR BLD AUTO: 3.85 10*3/MM3 (ref 1.7–7)
NEUTROPHILS NFR BLD AUTO: 62.8 % (ref 42.7–76)
NRBC BLD AUTO-RTO: 0 /100 WBC (ref 0–0.2)
PLATELET # BLD AUTO: 174 10*3/MM3 (ref 140–450)
PMV BLD AUTO: 10.9 FL (ref 6–12)
POTASSIUM SERPL-SCNC: 4.2 MMOL/L (ref 3.5–5.2)
PROT SERPL-MCNC: 7.2 G/DL (ref 6–8.5)
RBC # BLD AUTO: 4.62 10*6/MM3 (ref 3.77–5.28)
SODIUM SERPL-SCNC: 141 MMOL/L (ref 136–145)
TRIGL SERPL-MCNC: 109 MG/DL (ref 0–150)
TSH SERPL DL<=0.05 MIU/L-ACNC: 3.07 UIU/ML (ref 0.27–4.2)
VIT B12 BLD-MCNC: 1046 PG/ML (ref 211–946)
VLDLC SERPL-MCNC: 19 MG/DL (ref 5–40)
WBC # BLD AUTO: 6.13 10*3/MM3 (ref 3.4–10.8)

## 2020-12-03 PROCEDURE — 83036 HEMOGLOBIN GLYCOSYLATED A1C: CPT

## 2020-12-03 PROCEDURE — 80061 LIPID PANEL: CPT

## 2020-12-03 PROCEDURE — 82746 ASSAY OF FOLIC ACID SERUM: CPT

## 2020-12-03 PROCEDURE — 36415 COLL VENOUS BLD VENIPUNCTURE: CPT

## 2020-12-03 PROCEDURE — 84443 ASSAY THYROID STIM HORMONE: CPT

## 2020-12-03 PROCEDURE — 85025 COMPLETE CBC W/AUTO DIFF WBC: CPT

## 2020-12-03 PROCEDURE — 80053 COMPREHEN METABOLIC PANEL: CPT

## 2020-12-03 PROCEDURE — 82607 VITAMIN B-12: CPT

## 2020-12-18 ENCOUNTER — OFFICE VISIT (OUTPATIENT)
Dept: PULMONOLOGY | Facility: CLINIC | Age: 65
End: 2020-12-18

## 2020-12-18 ENCOUNTER — HOSPITAL ENCOUNTER (OUTPATIENT)
Dept: CT IMAGING | Facility: HOSPITAL | Age: 65
Discharge: HOME | End: 2020-12-18
Admitting: INTERNAL MEDICINE

## 2020-12-18 VITALS
HEIGHT: 65 IN | TEMPERATURE: 97.8 F | WEIGHT: 200 LBS | BODY MASS INDEX: 33.32 KG/M2 | OXYGEN SATURATION: 98 % | HEART RATE: 87 BPM | DIASTOLIC BLOOD PRESSURE: 80 MMHG | RESPIRATION RATE: 18 BRPM | SYSTOLIC BLOOD PRESSURE: 130 MMHG

## 2020-12-18 DIAGNOSIS — R91.1 LUNG NODULE SEEN ON IMAGING STUDY: Primary | ICD-10-CM

## 2020-12-18 DIAGNOSIS — R05.9 COUGH: ICD-10-CM

## 2020-12-18 DIAGNOSIS — R91.1 LUNG NODULE SEEN ON IMAGING STUDY: ICD-10-CM

## 2020-12-18 PROCEDURE — 71250 CT THORAX DX C-: CPT

## 2020-12-18 PROCEDURE — 99213 OFFICE O/P EST LOW 20 MIN: CPT | Performed by: INTERNAL MEDICINE

## 2020-12-18 RX ORDER — BETAMETHASONE DIPROPIONATE 0.5 MG/G
LOTION TOPICAL
COMMUNITY

## 2020-12-18 RX ORDER — METRONIDAZOLE 250 MG/1
TABLET ORAL
COMMUNITY
End: 2021-02-02

## 2020-12-18 RX ORDER — ONDANSETRON 4 MG/1
TABLET, FILM COATED ORAL
COMMUNITY
End: 2021-02-05 | Stop reason: HOSPADM

## 2020-12-18 RX ORDER — GABAPENTIN 100 MG/1
CAPSULE ORAL
COMMUNITY

## 2020-12-18 NOTE — PROGRESS NOTES
Pulmonary follow-up office Visit      Chief Complaint:    Chief Complaint   Patient presents with   • Follow-up   • lung problem       Subjective:   Zuri Love is a 65 y.o. female who is here today for follow-up on lung nodule.  She had repeat CT scan earlier this morning, but no radiology reading as of yet.    She had and incidentally noted lung nodule 7 mm in May 2020.  She denies any fevers, chills, night sweats, hemoptysis or shortness of breath  She has lost 15 pounds intentionally because of pain and cholesterol issues.  She also recently lost her father over the summer and has had a lot of stress with dealing with her mother since that time.  She continues to have chronic dry cough which is been unchanged for many years, but relates this back to her seasonal allergies.      Subjective      Review of Systems:   Review of Systems   Constitutional: Negative for appetite change, chills and fever.   HENT: Negative for nosebleeds, sore throat and voice change.    Eyes: Negative for discharge and visual disturbance.   Respiratory: Negative for cough, shortness of breath and wheezing.    Cardiovascular: Negative for chest pain, palpitations and leg swelling.   Gastrointestinal: Negative for abdominal pain, blood in stool, constipation, diarrhea and GERD.   Endocrine: Negative for cold intolerance and heat intolerance.   Genitourinary: Negative for difficulty urinating.   Musculoskeletal: Positive for arthralgias and back pain. Negative for neck pain and neck stiffness.   Skin: Negative for rash and bruise.   Allergic/Immunologic: Negative for immunocompromised state.   Neurological: Negative for dizziness and weakness.   Hematological: Negative for adenopathy. Does not bruise/bleed easily.   Psychiatric/Behavioral: Negative for suicidal ideas and depressed mood.   All other systems reviewed and are negative.      Past Medical History:   Past Medical History:   Diagnosis Date   • Arthritis    • Back  pain 2000   • Body piercing     both ears   • Cervical lymphadenopathy    • Elevated C-reactive protein (CRP)    • GERD (gastroesophageal reflux disease)    • Injury of neck    • Kidney stone 2010   • Neck pain    • Psoriasis    • Ulcer    • Wears contact lenses    • Wears glasses        Past Surgical History:   Past Surgical History:   Procedure Laterality Date   • ANAL FISTULA REPAIR     • COLONOSCOPY  2013   • COLONOSCOPY N/A 6/17/2020    Procedure: COLONOSCOPY;  Surgeon: Miguel A Adams MD;  Location: [facility] ENDOSCOPY;  Service: Gastroenterology;  Laterality: N/A;   • ENDOSCOPY     • FACIAL RECONSTRUCTION SURGERY     • HYSTERECTOMY  2001   • KNEE SURGERY Left 2017    arthroscopy   • TONSILLECTOMY     • WISDOM TOOTH EXTRACTION         Family History:   Family History   Problem Relation Age of Onset   • Hypertension Mother    • Diverticulitis Mother    • Hypertension Father    • Diabetes Father        Social History:   Social History     Socioeconomic History   • Marital status: Single     Spouse name: Not on file   • Number of children: Not on file   • Years of education: Not on file   • Highest education level: Not on file   Tobacco Use   • Smoking status: Never Smoker   • Smokeless tobacco: Never Used   Substance and Sexual Activity   • Alcohol use: Yes     Alcohol/week: 2.0 standard drinks     Types: 1 Glasses of wine, 1 Shots of liquor per week     Comment: occ   • Drug use: No   • Sexual activity: Defer       Medications:     Current Outpatient Medications:   •  Ascorbic Acid (VITAMIN C PO), Take 500 mg by mouth Daily., Disp: , Rfl:   •  betamethasone dipropionate (DIPROLENE) 0.05 % lotion, betamethasone dipropionate 0.05 % lotion  APPLY FEW DROPS TO AFFECTED AREAS 2 TIMES DAY IN THE MORNING & BEDTIME...., Disp: , Rfl:   •  estradiol (ESTRACE) 0.5 MG tablet, Take 0.5 mg by mouth Daily., Disp: , Rfl:   •  gabapentin (NEURONTIN) 100 MG capsule, gabapentin 100 mg capsule  TAKE 1 CAPSULE BY MOUTH THREE TIMES A  "DAY, Disp: , Rfl:   •  Misc Natural Products (GLUCOSAMINE CHONDROITIN ADV PO), Take 1 capsule by mouth Daily., Disp: , Rfl:   •  Omega-3 Fatty Acids (FISH OIL PO), Take 1 capsule by mouth Daily., Disp: , Rfl:   •  ondansetron (ZOFRAN) 4 MG tablet, ondansetron HCl 4 mg tablet, Disp: , Rfl:   •  Red Yeast Rice Extract (RED YEAST RICE PO), Take 1 capsule by mouth Daily., Disp: , Rfl:   •  TURMERIC PO, Take 1 capsule by mouth Daily., Disp: , Rfl:   •  dicyclomine (Bentyl) 20 MG tablet, Take 1 tablet by mouth Every 6 (Six) Hours As Needed (Abdominal cramping)., Disp: 20 tablet, Rfl: 0  •  HYDROcodone-acetaminophen (NORCO) 5-325 MG per tablet, Take 1 tablet by mouth Every 6 (Six) Hours As Needed., Disp: , Rfl:   •  metroNIDAZOLE (FLAGYL) 250 MG tablet, metronidazole 250 mg tablet, Disp: , Rfl:   •  nystatin (MYCOSTATIN) 594285 UNIT/ML suspension, nystatin 100,000 unit/mL oral suspension, Disp: , Rfl:   •  ondansetron ODT (ZOFRAN-ODT) 4 MG disintegrating tablet, Place 1 tablet on the tongue Every 8 (Eight) Hours As Needed for Nausea or Vomiting., Disp: 12 tablet, Rfl: 0    Allergies:   No Known Allergies    Objective     Physical Exam:  Vital Signs:   Vitals:    12/18/20 1024   BP: 130/80   Pulse: 87   Resp: 18   Temp: 97.8 °F (36.6 °C)   SpO2: 98%   Weight: 90.7 kg (200 lb)   Height: 165.1 cm (65\")       Physical Exam   Constitutional: She is oriented to person, place, and time. She appears well-developed and well-nourished.   HENT:   Head: Normocephalic and atraumatic.   Right Ear: External ear normal.   Left Ear: External ear normal.   Mouth/Throat: Oropharynx is clear and moist. Mucous membranes are moist. No oropharyngeal exudate. Oropharynx is clear.   Eyes: Conjunctivae are normal. Right eye exhibits no discharge. Left eye exhibits no discharge. No scleral icterus.   Neck: Normal range of motion. Neck supple.   Supraclavicular fullness bilaterally, but left much greater than right   Cardiovascular: Normal rate and " regular rhythm.   No murmur heard.  Pulmonary/Chest: Effort normal and breath sounds normal. No respiratory distress. She has no wheezes.   Abdominal: Soft. She exhibits no distension.   Musculoskeletal: Normal range of motion.      Right lower leg: No edema.      Left lower leg: No edema.   Neurological: She is alert and oriented to person, place, and time. Gait normal.   Skin: Skin is warm. No rash noted.   Psychiatric: Her behavior is normal. Mood, judgment and thought content normal.         Results Review:   I reviewed the patient's new clinical results.    Radiology Scans:   Personally reviewed the following exams:  CT Chest Without Contrast  Narrative:    PROCEDURE: CT CHEST WO CONTRAST-     HISTORY: Abnormal xray - lung nodule, < 1 cm, mod-high risk     COMPARISON: None.     TECHNIQUE: Axial CT without IV contrast administration.     FINDINGS:     No acute lung disease is present.  A 5 mm peripheral nodule seen of the  anterior aspect of the right upper lobe on image 22. Calcified granuloma  seen in the right lower lobe.     No pleural or pericardial effusion is seen. No adenopathy or mass lesion  is present. Fatty infiltration of the liver is present.     Impression: 1. A 5 mm right upper lobe nodule favor benign. Recommend 6-12 month  chest CT follow-up which may be done without contrast.        This study was performed with techniques to keep radiation doses as low  as reasonably achievable (ALARA). Individualized dose reduction  techniques using automated exposure control or adjustment of vA and/or  kV according to the patient size were employed.      This report was finalized on 12/18/2020 5:22 PM by Yury Luciano MD.        EXAMINATION: CT CHEST W CONTRAST, CT SOFT TISSUE NECK W CONTRAST -  05/15/2020     INDICATION: Left-sided neck pain and swollen lymph nodes in the left  neck.     R59.1-Generalized enlarged lymph nodes.      TECHNIQUE: Multiple axial CT imaging is obtained of the neck and  chest  following the administration of intravenous contrast.     The radiation dose reduction device was turned on for each scan per the  ALARA (As Low as Reasonably Achievable) protocol.     COMPARISON: None.     FINDINGS:      NECK: Thyroid is homogeneous in appearance. The visualized paranasal  sinuses are clear. Globes and orbits are intact. Mastoid air cells are  patent. Airway is patent. There are small lymph nodes identified in the  anterior and posterior cervical chains bilaterally with no bulky  adenopathy identified. The lymph nodes do not meet CT size criteria to  be considered adenopathy. Nodularity identified within the salivary  glands bilaterally suggesting possible small lymph nodes or adenomas.  Minimal degenerative changes seen within the spine. Small nodules  identified within the thyroid bilaterally. The largest lymph node  identified within the right neck is a jugulodigastric lymph node  measuring 1.5 cm. This lymph node demonstrates a central fatty hilum.  There are small lymph nodes in the supraclavicular region bilaterally.  There is no bulky adenopathy identified. There is no abnormality seen  within the salivary glands. The musculature is intact and unremarkable.     CHEST: No mediastinal mass or adenopathy. No bulky hilar or axillary  lymphadenopathy identified. Cardiac chambers within normal limits. There  is no pericardial effusion. The lung parenchyma reveals no parenchymal  consolidation with small nodule identified anteriorly within the right  upper lobe in the periphery, nonspecific in appearance and measuring 7  mm. Six-month followup is recommended for stability. Findings may  suggest a noncalcified granuloma. The remainder of the lung parenchyma  is grossly clear. Degenerative changes seen within the spine. Old healed  granulomatous disease seen within the chest. No definite pleural  effusion or pneumothorax. No parenchymal consolidation. The visualized  upper abdomen is  unremarkable.     IMPRESSION:  Small lymph nodes normal in size and appearance scattered  throughout the anterior and posterior cervical chains with no bulky  adenopathy identified. No abnormal mass or fluid collection seen within  the neck. There is old healed granulomatous disease seen within the  chest with a 7 mm noncalcified nodule anteriorly within the right upper  lobe for which six-month follow-up is recommended for stability. No  acute intrathoracic abnormality and  no features of pneumonia.    PFT IMPRESSION:    None available for review  Assessment / Plan      Assessment/Plan:    1. Lung nodule seen on imaging study  7 mm lung nodule in low risk patient given lack of smoking history initial reading, but now measuring 5 mm on repeat CT scan.  Repeat CT scan done this a.m. and now final radiology reading available.  Follow-up in 6 months to a year based on CT scan findings    2.  Cough  This is chronic and unchanged.  Feels like it is mostly related to her GERD.    Follow Up:   No follow-ups on file.    Sue Brito MD  Pulmonary/Critical Care Physician   Mark      Please note that portions of this note may have been completed with a voice recognition program. Efforts were made to edit the dictations, but occasionally words are mistranscribed.

## 2020-12-23 DIAGNOSIS — R91.1 LUNG NODULE SEEN ON IMAGING STUDY: Primary | ICD-10-CM

## 2021-01-25 ENCOUNTER — LAB (OUTPATIENT)
Dept: LAB | Facility: HOSPITAL | Age: 66
End: 2021-01-25

## 2021-01-25 ENCOUNTER — TRANSCRIBE ORDERS (OUTPATIENT)
Dept: ADMINISTRATIVE | Facility: HOSPITAL | Age: 66
End: 2021-01-25

## 2021-01-25 ENCOUNTER — TRANSCRIBE ORDERS (OUTPATIENT)
Dept: LAB | Facility: HOSPITAL | Age: 66
End: 2021-01-25

## 2021-01-25 ENCOUNTER — HOSPITAL ENCOUNTER (OUTPATIENT)
Dept: CT IMAGING | Facility: HOSPITAL | Age: 66
Discharge: HOME | End: 2021-01-25
Admitting: FAMILY MEDICINE

## 2021-01-25 DIAGNOSIS — R10.9 STOMACH ACHE: Primary | ICD-10-CM

## 2021-01-25 DIAGNOSIS — R10.9 STOMACH ACHE: ICD-10-CM

## 2021-01-25 DIAGNOSIS — R10.31 RLQ ABDOMINAL PAIN: ICD-10-CM

## 2021-01-25 DIAGNOSIS — R10.31 RLQ ABDOMINAL PAIN: Primary | ICD-10-CM

## 2021-01-25 LAB
BASOPHILS # BLD AUTO: 0.03 10*3/MM3 (ref 0–0.2)
BASOPHILS NFR BLD AUTO: 0.4 % (ref 0–1.5)
BUN SERPL-MCNC: 10 MG/DL (ref 8–23)
CREAT BLDA-MCNC: 0.6 MG/DL (ref 0.6–1.3)
CREAT SERPL-MCNC: 0.55 MG/DL (ref 0.57–1)
DEPRECATED RDW RBC AUTO: 38.8 FL (ref 37–54)
EOSINOPHIL # BLD AUTO: 0.13 10*3/MM3 (ref 0–0.4)
EOSINOPHIL NFR BLD AUTO: 1.8 % (ref 0.3–6.2)
ERYTHROCYTE [DISTWIDTH] IN BLOOD BY AUTOMATED COUNT: 11.9 % (ref 12.3–15.4)
GFR SERPL CREATININE-BSD FRML MDRD: 111 ML/MIN/1.73
HCT VFR BLD AUTO: 41.8 % (ref 34–46.6)
HGB BLD-MCNC: 14.3 G/DL (ref 12–15.9)
IMM GRANULOCYTES # BLD AUTO: 0.04 10*3/MM3 (ref 0–0.05)
IMM GRANULOCYTES NFR BLD AUTO: 0.6 % (ref 0–0.5)
LYMPHOCYTES # BLD AUTO: 1.9 10*3/MM3 (ref 0.7–3.1)
LYMPHOCYTES NFR BLD AUTO: 26.7 % (ref 19.6–45.3)
MCH RBC QN AUTO: 30.5 PG (ref 26.6–33)
MCHC RBC AUTO-ENTMCNC: 34.2 G/DL (ref 31.5–35.7)
MCV RBC AUTO: 89.1 FL (ref 79–97)
MONOCYTES # BLD AUTO: 0.52 10*3/MM3 (ref 0.1–0.9)
MONOCYTES NFR BLD AUTO: 7.3 % (ref 5–12)
NEUTROPHILS NFR BLD AUTO: 4.49 10*3/MM3 (ref 1.7–7)
NEUTROPHILS NFR BLD AUTO: 63.2 % (ref 42.7–76)
NRBC BLD AUTO-RTO: 0 /100 WBC (ref 0–0.2)
PLATELET # BLD AUTO: 168 10*3/MM3 (ref 140–450)
PMV BLD AUTO: 11.1 FL (ref 6–12)
RBC # BLD AUTO: 4.69 10*6/MM3 (ref 3.77–5.28)
WBC # BLD AUTO: 7.11 10*3/MM3 (ref 3.4–10.8)

## 2021-01-25 PROCEDURE — 0 DIATRIZOATE MEGLUMINE & SODIUM PER 1 ML: Performed by: FAMILY MEDICINE

## 2021-01-25 PROCEDURE — 25010000002 IOPAMIDOL 61 % SOLUTION: Performed by: FAMILY MEDICINE

## 2021-01-25 PROCEDURE — 74177 CT ABD & PELVIS W/CONTRAST: CPT

## 2021-01-25 PROCEDURE — 82565 ASSAY OF CREATININE: CPT

## 2021-01-25 PROCEDURE — 85025 COMPLETE CBC W/AUTO DIFF WBC: CPT

## 2021-01-25 PROCEDURE — 36415 COLL VENOUS BLD VENIPUNCTURE: CPT

## 2021-01-25 PROCEDURE — 84520 ASSAY OF UREA NITROGEN: CPT

## 2021-01-25 RX ADMIN — IOPAMIDOL 95 ML: 612 INJECTION, SOLUTION INTRAVENOUS at 15:46

## 2021-01-25 RX ADMIN — DIATRIZOATE MEGLUMINE AND DIATRIZOATE SODIUM 15 ML: 660; 100 LIQUID ORAL; RECTAL at 15:46

## 2021-02-02 ENCOUNTER — OFFICE VISIT (OUTPATIENT)
Dept: GASTROENTEROLOGY | Facility: CLINIC | Age: 66
End: 2021-02-02

## 2021-02-02 VITALS
HEART RATE: 69 BPM | BODY MASS INDEX: 33.66 KG/M2 | TEMPERATURE: 96.8 F | HEIGHT: 65 IN | DIASTOLIC BLOOD PRESSURE: 70 MMHG | SYSTOLIC BLOOD PRESSURE: 110 MMHG | OXYGEN SATURATION: 97 % | WEIGHT: 202 LBS

## 2021-02-02 DIAGNOSIS — R19.7 BLOODY DIARRHEA: ICD-10-CM

## 2021-02-02 DIAGNOSIS — R10.30 LOWER ABDOMINAL PAIN: Primary | ICD-10-CM

## 2021-02-02 DIAGNOSIS — Z01.818 PREOP TESTING: Primary | ICD-10-CM

## 2021-02-02 DIAGNOSIS — K21.9 GASTROESOPHAGEAL REFLUX DISEASE WITHOUT ESOPHAGITIS: ICD-10-CM

## 2021-02-02 DIAGNOSIS — R10.13 EPIGASTRIC PAIN: ICD-10-CM

## 2021-02-02 DIAGNOSIS — Z86.010 PERSONAL HISTORY OF COLONIC POLYPS: ICD-10-CM

## 2021-02-02 PROCEDURE — 99214 OFFICE O/P EST MOD 30 MIN: CPT | Performed by: INTERNAL MEDICINE

## 2021-02-02 RX ORDER — SODIUM CHLORIDE 9 MG/ML
70 INJECTION, SOLUTION INTRAVENOUS CONTINUOUS PRN
Status: CANCELLED | OUTPATIENT
Start: 2021-02-02

## 2021-02-02 RX ORDER — MULTIVIT-MIN/IRON/FOLIC ACID/K 18-600-40
CAPSULE ORAL
COMMUNITY

## 2021-02-02 RX ORDER — PANTOPRAZOLE SODIUM 40 MG/1
40 TABLET, DELAYED RELEASE ORAL DAILY
Qty: 30 TABLET | Refills: 2 | Status: SHIPPED | OUTPATIENT
Start: 2021-02-02 | End: 2021-03-04

## 2021-02-02 RX ORDER — DICYCLOMINE HCL 20 MG
20 TABLET ORAL 3 TIMES DAILY PRN
Qty: 60 TABLET | Refills: 1 | Status: SHIPPED | OUTPATIENT
Start: 2021-02-02 | End: 2021-10-04

## 2021-02-02 RX ORDER — CALCIUM CARBONATE 260MG(650)
TABLET,CHEWABLE ORAL
COMMUNITY
End: 2021-10-04

## 2021-02-02 NOTE — PROGRESS NOTES
Follow Up Note     Date: 2021   Patient Name: Zuri Love  MRN: [id number]  : 1955     Referring Physician: Mary Romo MD    Chief Complaint:    Chief Complaint   Patient presents with   • Follow-up   • Abdominal Pain       Interval History:   2021  Zuri Love is a 65 y.o. female who is here today for follow up for abdominal pain. Recent episode of RLQ pain. She had bloody diarrhea after the pain episode after the pain episode.  She took some peptobismol that did not help but her diarrhea gradually improved and having 1 or 2 bowel movements soft now. She also developed epigastric pain with worsening reflux symptoms.  She also has a severe nausea.  States that she had a prior history peptic ulcer disease in the past    10/08/2029  There is history of RLQ abdominal pain off and on for the last 2 months or so.  The pain is gradual in onset, moderate in severity and aching in character.  Frequency being 3-4 times a week.  The pain may last for 20 to 30 minutes.  There is no radiation of abdominal pain.  Eating certain foods like chocolates and knots make the pain worse.  The patient feels better after a bowel movement.  About 3 weeks ago the patient had some associated fever and chills.  Patient also had cramping in the right lower quadrant while sitting on the toilet.  This was associated with a bowel movement.  She denies history of diarrhea or constipation.       The patient has history of recurrent nausea for the last 2 months.  The nausea is described as moderately severe, frequency being several times a week.  Nauseous feeling may last for a couple of hours.  Eating worsens the symptom however no definite relieving factors of nausea.  There is no associated vomiting.     The patient has a history of reflux off and on for the last several years.  The reflux is moderately severe.  Symptoms are described as retrosternal burning sensation, and indigestion.  There is  history of occasional regurgitative symptoms.  Frequency being several times per week.  The symptoms are worse at night.  The patient takes acid suppressive therapy with reasonable control of his symptoms.  Her last colonoscopy was 6 years ago in 2013.  The patient had multiple polyps.  There is no family history of colon cancer, inflammatory bowel disease, celiac disease or chronic liver disease.  There is history of peptic ulcer disease for which the patient was treated in 2003.  Subjective      Past Medical History:   Past Medical History:   Diagnosis Date   • Arthritis    • Back pain 2000   • Body piercing     both ears   • Cervical lymphadenopathy    • Elevated C-reactive protein (CRP)    • GERD (gastroesophageal reflux disease)    • Injury of neck    • Kidney stone 2010   • Neck pain    • Psoriasis    • Ulcer    • Wears contact lenses    • Wears glasses      Past Surgical History:   Past Surgical History:   Procedure Laterality Date   • ANAL FISTULA REPAIR     • COLONOSCOPY  2013   • COLONOSCOPY N/A 6/17/2020    Procedure: COLONOSCOPY;  Surgeon: Miguel A Adams MD;  Location: [facility] ENDOSCOPY;  Service: Gastroenterology;  Laterality: N/A;   • ENDOSCOPY     • FACIAL RECONSTRUCTION SURGERY     • HYSTERECTOMY  2001   • KNEE SURGERY Left 2017    arthroscopy   • TONSILLECTOMY     • WISDOM TOOTH EXTRACTION         Family History:   Family History   Problem Relation Age of Onset   • Hypertension Mother    • Diverticulitis Mother    • Hypertension Father    • Diabetes Father        Social History:   Social History     Socioeconomic History   • Marital status: Single     Spouse name: Not on file   • Number of children: Not on file   • Years of education: Not on file   • Highest education level: Not on file   Tobacco Use   • Smoking status: Never Smoker   • Smokeless tobacco: Never Used   Substance and Sexual Activity   • Alcohol use: Yes     Alcohol/week: 2.0 standard drinks     Types: 1 Glasses of wine, 1 Shots of  liquor per week     Comment: occ   • Drug use: No   • Sexual activity: Defer       Medications:     Current Outpatient Medications:   •  Arginine 500 MG capsule, Take  by mouth., Disp: , Rfl:   •  Ascorbic Acid (VITAMIN C PO), Take 500 mg by mouth Daily., Disp: , Rfl:   •  betamethasone dipropionate (DIPROLENE) 0.05 % lotion, betamethasone dipropionate 0.05 % lotion  APPLY FEW DROPS TO AFFECTED AREAS 2 TIMES DAY IN THE MORNING & BEDTIME...., Disp: , Rfl:   •  Calcium Carbonate-Vit D-Min (CALCIUM 1200 PO), Take  by mouth., Disp: , Rfl:   •  dicyclomine (Bentyl) 20 MG tablet, Take 1 tablet by mouth Every 6 (Six) Hours As Needed (Abdominal cramping)., Disp: 20 tablet, Rfl: 0  •  estradiol (ESTRACE) 0.5 MG tablet, Take 0.5 mg by mouth Daily., Disp: , Rfl:   •  gabapentin (NEURONTIN) 100 MG capsule, gabapentin 100 mg capsule  TAKE 1 CAPSULE BY MOUTH THREE TIMES A DAY, Disp: , Rfl:   •  HYDROcodone-acetaminophen (NORCO) 5-325 MG per tablet, Take 1 tablet by mouth Every 6 (Six) Hours As Needed., Disp: , Rfl:   •  Misc Natural Products (GLUCOSAMINE CHONDROITIN ADV PO), Take 1 capsule by mouth Daily., Disp: , Rfl:   •  Omega-3 Fatty Acids (FISH OIL PO), Take 1 capsule by mouth Daily., Disp: , Rfl:   •  ondansetron ODT (ZOFRAN-ODT) 4 MG disintegrating tablet, Place 1 tablet on the tongue Every 8 (Eight) Hours As Needed for Nausea or Vomiting., Disp: 12 tablet, Rfl: 0  •  Red Yeast Rice Extract (RED YEAST RICE PO), Take 1 capsule by mouth Daily., Disp: , Rfl:   •  TURMERIC PO, Take 1 capsule by mouth Daily., Disp: , Rfl:   •  Vitamin D, Cholecalciferol, 50 MCG (2000 UT) capsule, Take  by mouth., Disp: , Rfl:   •  Zinc 10 MG lozenge, Dissolve  in the mouth., Disp: , Rfl:   •  ondansetron (ZOFRAN) 4 MG tablet, ondansetron HCl 4 mg tablet, Disp: , Rfl:     Allergies:   No Known Allergies    Review of Systems:   Review of Systems   Constitutional: Negative for appetite change, fatigue and unexpected weight loss.   HENT: Negative  "for trouble swallowing.    Gastrointestinal: Positive for abdominal pain, blood in stool, diarrhea and nausea. Negative for abdominal distention, anal bleeding, constipation, rectal pain, vomiting, GERD and indigestion.       The following portions of the patient's history were reviewed and updated as appropriate: allergies, current medications, past family history, past medical history, past social history, past surgical history and problem list.    Objective     Physical Exam:  Vital Signs:   Vitals:    02/02/21 1443   BP: 110/70   Pulse: 69   Temp: 96.8 °F (36 °C)   TempSrc: Temporal   SpO2: 97%   Weight: 91.6 kg (202 lb)   Height: 165.1 cm (65\")       Physical Exam  Vitals signs and nursing note reviewed.   Constitutional:       Appearance: She is well-developed.   Eyes:      Conjunctiva/sclera: Conjunctivae normal.   Neck:      Musculoskeletal: Normal range of motion and neck supple.   Cardiovascular:      Rate and Rhythm: Normal rate and regular rhythm.      Heart sounds: No murmur.   Pulmonary:      Effort: Pulmonary effort is normal. No respiratory distress.      Breath sounds: Normal breath sounds.   Abdominal:      General: Bowel sounds are normal. There is no distension.      Palpations: Abdomen is soft. There is no mass.      Tenderness: There is abdominal tenderness (Mild epigastric tenderness noted, also has some discomfort in both lower flank on deep palpation).      Hernia: No hernia is present.   Lymphadenopathy:      Cervical: No cervical adenopathy.   Skin:     General: Skin is warm and dry.   Neurological:      General: No focal deficit present.      Mental Status: She is alert and oriented to person, place, and time.      Sensory: No sensory deficit.         Results Review:   I reviewed the patient's new clinical results.    Hospital Outpatient Visit on 01/25/2021   Component Date Value Ref Range Status   • Creatinine 01/25/2021 0.60  0.60 - 1.30 mg/dL Final    Serial Number: [id number]:  " [id number]   Lab on 01/25/2021   Component Date Value Ref Range Status   • BUN 01/25/2021 10  8 - 23 mg/dL Final   • Creatinine 01/25/2021 0.55* 0.57 - 1.00 mg/dL Final   • eGFR Non  Amer 01/25/2021 111  >60 mL/min/1.73 Final   • WBC 01/25/2021 7.11  3.40 - 10.80 10*3/mm3 Final   • RBC 01/25/2021 4.69  3.77 - 5.28 10*6/mm3 Final   • Hemoglobin 01/25/2021 14.3  12.0 - 15.9 g/dL Final   • Hematocrit 01/25/2021 41.8  34.0 - 46.6 % Final   • MCV 01/25/2021 89.1  79.0 - 97.0 fL Final   • MCH 01/25/2021 30.5  26.6 - 33.0 pg Final   • MCHC 01/25/2021 34.2  31.5 - 35.7 g/dL Final   • RDW 01/25/2021 11.9* 12.3 - 15.4 % Final   • RDW-SD 01/25/2021 38.8  37.0 - 54.0 fl Final   • MPV 01/25/2021 11.1  6.0 - 12.0 fL Final   • Platelets 01/25/2021 168  140 - 450 10*3/mm3 Final   • Neutrophil % 01/25/2021 63.2  42.7 - 76.0 % Final   • Lymphocyte % 01/25/2021 26.7  19.6 - 45.3 % Final   • Monocyte % 01/25/2021 7.3  5.0 - 12.0 % Final   • Eosinophil % 01/25/2021 1.8  0.3 - 6.2 % Final   • Basophil % 01/25/2021 0.4  0.0 - 1.5 % Final   • Immature Grans % 01/25/2021 0.6* 0.0 - 0.5 % Final   • Neutrophils, Absolute 01/25/2021 4.49  1.70 - 7.00 10*3/mm3 Final   • Lymphocytes, Absolute 01/25/2021 1.90  0.70 - 3.10 10*3/mm3 Final   • Monocytes, Absolute 01/25/2021 0.52  0.10 - 0.90 10*3/mm3 Final   • Eosinophils, Absolute 01/25/2021 0.13  0.00 - 0.40 10*3/mm3 Final   • Basophils, Absolute 01/25/2021 0.03  0.00 - 0.20 10*3/mm3 Final   • Immature Grans, Absolute 01/25/2021 0.04  0.00 - 0.05 10*3/mm3 Final   • nRBC 01/25/2021 0.0  0.0 - 0.2 /100 WBC Final   Lab on 12/03/2020   Component Date Value Ref Range Status   • Folate 12/03/2020 16.30  4.78 - 24.20 ng/mL Final   • Glucose 12/03/2020 96  65 - 99 mg/dL Final   • BUN 12/03/2020 12  8 - 23 mg/dL Final   • Creatinine 12/03/2020 0.64  0.57 - 1.00 mg/dL Final   • Sodium 12/03/2020 141  136 - 145 mmol/L Final   • Potassium 12/03/2020 4.2  3.5 - 5.2 mmol/L Final   • Chloride 12/03/2020 106   98 - 107 mmol/L Final   • CO2 12/03/2020 27.3  22.0 - 29.0 mmol/L Final   • Calcium 12/03/2020 9.1  8.6 - 10.5 mg/dL Final   • Total Protein 12/03/2020 7.2  6.0 - 8.5 g/dL Final   • Albumin 12/03/2020 4.00  3.50 - 5.20 g/dL Final   • ALT (SGPT) 12/03/2020 19  1 - 33 U/L Final   • AST (SGOT) 12/03/2020 18  1 - 32 U/L Final   • Alkaline Phosphatase 12/03/2020 51  39 - 117 U/L Final   • Total Bilirubin 12/03/2020 0.7  0.0 - 1.2 mg/dL Final   • eGFR Non African Amer 12/03/2020 93  >60 mL/min/1.73 Final   • Globulin 12/03/2020 3.2  gm/dL Final   • A/G Ratio 12/03/2020 1.3  g/dL Final   • BUN/Creatinine Ratio 12/03/2020 18.8  7.0 - 25.0 Final   • Anion Gap 12/03/2020 7.7  5.0 - 15.0 mmol/L Final   • Total Cholesterol 12/03/2020 230* 0 - 200 mg/dL Final   • Triglycerides 12/03/2020 109  0 - 150 mg/dL Final   • HDL Cholesterol 12/03/2020 73* 40 - 60 mg/dL Final   • LDL Cholesterol  12/03/2020 138* 0 - 100 mg/dL Final   • VLDL Cholesterol 12/03/2020 19  5 - 40 mg/dL Final   • LDL/HDL Ratio 12/03/2020 1.85   Final   • Hemoglobin A1C 12/03/2020 5.56  4.80 - 5.60 % Final   • TSH 12/03/2020 3.070  0.270 - 4.200 uIU/mL Final   • Vitamin B-12 12/03/2020 1,046* 211 - 946 pg/mL Final   • WBC 12/03/2020 6.13  3.40 - 10.80 10*3/mm3 Final   • RBC 12/03/2020 4.62  3.77 - 5.28 10*6/mm3 Final   • Hemoglobin 12/03/2020 14.2  12.0 - 15.9 g/dL Final   • Hematocrit 12/03/2020 41.9  34.0 - 46.6 % Final   • MCV 12/03/2020 90.7  79.0 - 97.0 fL Final   • MCH 12/03/2020 30.7  26.6 - 33.0 pg Final   • MCHC 12/03/2020 33.9  31.5 - 35.7 g/dL Final   • RDW 12/03/2020 13.0  12.3 - 15.4 % Final   • RDW-SD 12/03/2020 42.3  37.0 - 54.0 fl Final   • MPV 12/03/2020 10.9  6.0 - 12.0 fL Final   • Platelets 12/03/2020 174  140 - 450 10*3/mm3 Final   • Neutrophil % 12/03/2020 62.8  42.7 - 76.0 % Final   • Lymphocyte % 12/03/2020 25.4  19.6 - 45.3 % Final   • Monocyte % 12/03/2020 8.2  5.0 - 12.0 % Final   • Eosinophil % 12/03/2020 2.1  0.3 - 6.2 % Final   •  Basophil % 12/03/2020 0.7  0.0 - 1.5 % Final   • Immature Grans % 12/03/2020 0.8* 0.0 - 0.5 % Final   • Neutrophils, Absolute 12/03/2020 3.85  1.70 - 7.00 10*3/mm3 Final   • Lymphocytes, Absolute 12/03/2020 1.56  0.70 - 3.10 10*3/mm3 Final   • Monocytes, Absolute 12/03/2020 0.50  0.10 - 0.90 10*3/mm3 Final   • Eosinophils, Absolute 12/03/2020 0.13  0.00 - 0.40 10*3/mm3 Final   • Basophils, Absolute 12/03/2020 0.04  0.00 - 0.20 10*3/mm3 Final   • Immature Grans, Absolute 12/03/2020 0.05  0.00 - 0.05 10*3/mm3 Final   • nRBC 12/03/2020 0.0  0.0 - 0.2 /100 WBC Final   Admission on 11/05/2020, Discharged on 11/05/2020   Component Date Value Ref Range Status   • Glucose 11/05/2020 132* 65 - 99 mg/dL Final   • BUN 11/05/2020 13  8 - 23 mg/dL Final   • Creatinine 11/05/2020 0.56* 0.57 - 1.00 mg/dL Final   • Sodium 11/05/2020 140  136 - 145 mmol/L Final   • Potassium 11/05/2020 4.1  3.5 - 5.2 mmol/L Final   • Chloride 11/05/2020 103  98 - 107 mmol/L Final   • CO2 11/05/2020 27.6  22.0 - 29.0 mmol/L Final   • Calcium 11/05/2020 9.4  8.6 - 10.5 mg/dL Final   • Total Protein 11/05/2020 7.1  6.0 - 8.5 g/dL Final   • Albumin 11/05/2020 4.50  3.50 - 5.20 g/dL Final   • ALT (SGPT) 11/05/2020 23  1 - 33 U/L Final   • AST (SGOT) 11/05/2020 21  1 - 32 U/L Final   • Alkaline Phosphatase 11/05/2020 60  39 - 117 U/L Final   • Total Bilirubin 11/05/2020 1.0  0.0 - 1.2 mg/dL Final   • eGFR Non African Amer 11/05/2020 109  >60 mL/min/1.73 Final   • Globulin 11/05/2020 2.6  gm/dL Final   • A/G Ratio 11/05/2020 1.7  g/dL Final   • BUN/Creatinine Ratio 11/05/2020 23.2  7.0 - 25.0 Final   • Anion Gap 11/05/2020 9.4  5.0 - 15.0 mmol/L Final   • Lipase 11/05/2020 20  13 - 60 U/L Final   • Color, UA 11/05/2020 Yellow  Yellow, Straw Final   • Appearance, UA 11/05/2020 Cloudy* Clear Final   • pH, UA 11/05/2020 5.5  5.0 - 8.0 Final   • Specific Gravity, UA 11/05/2020 1.024  1.005 - 1.030 Final   • Glucose, UA 11/05/2020 Negative  Negative Final   •  Ketones, UA 11/05/2020 Negative  Negative Final   • Bilirubin, UA 11/05/2020 Negative  Negative Final   • Blood, UA 11/05/2020 Trace* Negative Final   • Protein, UA 11/05/2020 Trace* Negative Final   • Leuk Esterase, UA 11/05/2020 Negative  Negative Final   • Nitrite, UA 11/05/2020 Negative  Negative Final   • Urobilinogen, UA 11/05/2020 0.2 E.U./dL  0.2 - 1.0 E.U./dL Final   • Extra Tube 11/05/2020 hold for add-on   Final    Auto resulted   • Extra Tube 11/05/2020 Hold for add-ons.   Final    Auto resulted.   • Extra Tube 11/05/2020 hold for add-on   Final    Auto resulted   • Extra Tube 11/05/2020 Hold for add-ons.   Final    Auto resulted.   • WBC 11/05/2020 11.11* 3.40 - 10.80 10*3/mm3 Final   • RBC 11/05/2020 4.86  3.77 - 5.28 10*6/mm3 Final   • Hemoglobin 11/05/2020 14.8  12.0 - 15.9 g/dL Final   • Hematocrit 11/05/2020 44.1  34.0 - 46.6 % Final   • MCV 11/05/2020 90.7  79.0 - 97.0 fL Final   • MCH 11/05/2020 30.5  26.6 - 33.0 pg Final   • MCHC 11/05/2020 33.6  31.5 - 35.7 g/dL Final   • RDW 11/05/2020 12.3  12.3 - 15.4 % Final   • RDW-SD 11/05/2020 40.7  37.0 - 54.0 fl Final   • MPV 11/05/2020 10.3  6.0 - 12.0 fL Final   • Platelets 11/05/2020 184  140 - 450 10*3/mm3 Final   • Neutrophil % 11/05/2020 81.4* 42.7 - 76.0 % Final   • Lymphocyte % 11/05/2020 11.9* 19.6 - 45.3 % Final   • Monocyte % 11/05/2020 5.1  5.0 - 12.0 % Final   • Eosinophil % 11/05/2020 0.7  0.3 - 6.2 % Final   • Basophil % 11/05/2020 0.4  0.0 - 1.5 % Final   • Immature Grans % 11/05/2020 0.5  0.0 - 0.5 % Final   • Neutrophils, Absolute 11/05/2020 9.05* 1.70 - 7.00 10*3/mm3 Final   • Lymphocytes, Absolute 11/05/2020 1.32  0.70 - 3.10 10*3/mm3 Final   • Monocytes, Absolute 11/05/2020 0.57  0.10 - 0.90 10*3/mm3 Final   • Eosinophils, Absolute 11/05/2020 0.08  0.00 - 0.40 10*3/mm3 Final   • Basophils, Absolute 11/05/2020 0.04  0.00 - 0.20 10*3/mm3 Final   • Immature Grans, Absolute 11/05/2020 0.05  0.00 - 0.05 10*3/mm3 Final   • nRBC  11/05/2020 0.0  0.0 - 0.2 /100 WBC Final   • RBC, UA 11/05/2020 0-2* None Seen /HPF Final   • WBC, UA 11/05/2020 0-2* None Seen /HPF Final   • Bacteria, UA 11/05/2020 Trace* None Seen /HPF Final   • Squamous Epithelial Cells, UA 11/05/2020 3-6* None Seen, 0-2 /HPF Final   • Hyaline Casts, UA 11/05/2020 3-6  None Seen /LPF Final   • Mucus, UA 11/05/2020 Trace  None Seen, Trace /HPF Final   • Methodology 11/05/2020 Manual Light Microscopy   Final      Ct Chest Without Contrast    Result Date: 12/18/2020  1. A 5 mm right upper lobe nodule favor benign. Recommend 6-12 month chest CT follow-up which may be done without contrast.   This study was performed with techniques to keep radiation doses as low as reasonably achievable (ALARA). Individualized dose reduction techniques using automated exposure control or adjustment of vA and/or kV according to the patient size were employed.  This report was finalized on 12/18/2020 5:22 PM by Yury Luciano MD.    Ct Abdomen Pelvis With Contrast    Result Date: 1/26/2021  Essentially normal contrast-enhanced CT of the abdomen and pelvis.   This report was finalized on 1/26/2021 3:12 PM by Josafat Wan.      Ct Abdomen Pelvis With Contrast    Result Date: 11/5/2020  Fluid-filled, dilated loops of distal small bowel with adjacent mesenteric edema. Findings are likely due to infectious or inflammatory enteritis.  1099.39 mGy.cm   This study was performed with techniques to keep radiation doses as low as reasonably achievable (ALARA). Individualized dose reduction techniques using automated exposure control or adjustment of mA and/or kV according to the patient size were employed.      Images were reviewed, interpreted, and dictated by Dr. Macie Franco M.D. Transcribed by Lexus Figueroa PA-C.  This report was finalized on 11/5/2020 11:55 AM by Macie Franco M.D..      Assessment / Plan      1.  Lower abdominal pain  2. Bloody diarrhea  Suspected infective colitis    He  has a intermittent history of right lower abdominal pain but for the past year or so it did not bother her much. Recently she developed acute diarrhea and also noted red and dark blood in the stool for 3 days. Associated nausea but any fever chills. She had recent CT abdomen pelvis done with contrast which was unremarkable.  CT chest done last year showed a small benign looking lung nodule was unremarkable.  Lab studies done including CBC CMP recent was unremarkable. Her last colonoscopy done in June 2020 revealed only benign polyps. Clinical examination today is very benign without any point tenderness. Her lab studies were unremarkable including hemoglobin. Her symptoms have almost resolved now she had only 1 bowel movement today it was firm.   We will get a stool studies if there is any loose stools  Will hold off any antibiotic for now  Given prescription for Bentyl as needed      3.  Gastroesophageal reflux disease  4. Epigastric pain  She had a prior history of reflux disease however she was not taking any medications. Since her release and diarrhea episode she has been having severe reflux symptoms. She used over-the-counter medication which did not help her. She is worried now whether she has any ulcers as she had a peptic ulcer disease in the past. She does have a epigastric pain and tenderness. She also had a dark blood in the stool, spacious for whether there is any upper GI bleed  We will start her on Protonix 40 g p.o. daily  I will schedule her for EGD for further evaluation  The indications, technique, alternatives and potential risk and complications were discussed with the patient including but not limited to bleeding, bowel perforations, missing lesions and anesthetic complications. The patient understands and wishes to proceed with the procedure and has given their verbal consent. Written patient education information was given to the patient.   The patient will call if they have further questions  before procedure.       5.  Personal history of colon polyps  Last colonoscopy was in June 2022 polyps removed less than 5 mm in size.  Cecal polyp pathology was tubular adenoma without any dysplasia.  Rectal polyp was hyperplastic  She needs a repeat surveillance colonoscopy in 7 years time in 2029 or earlier    6. Obesity BMI 33  Advised regular exercise half hour every day at least 3 days in a week.  She needs to lose at least 15 to 20 pounds.  Reduced calorie intake  and lifestyle and dietary changes  have been discussed  Advised to avoid alcohol and smoking cigarette        Follow Up:   No follow-ups on file.    Kait Stone MD  [facility]  2/2/2021  14:50 EST     Please note that portions of this note may have been completed with a voice recognition program.

## 2021-02-02 NOTE — H&P (VIEW-ONLY)
Follow Up Note     Date: 2021   Patient Name: Zuri Love  MRN: [id number]  : 1955     Referring Physician: Mary Romo MD    Chief Complaint:    Chief Complaint   Patient presents with   • Follow-up   • Abdominal Pain       Interval History:   2021  Zuri Love is a 65 y.o. female who is here today for follow up for abdominal pain. Recent episode of RLQ pain. She had bloody diarrhea after the pain episode after the pain episode.  She took some peptobismol that did not help but her diarrhea gradually improved and having 1 or 2 bowel movements soft now. She also developed epigastric pain with worsening reflux symptoms.  She also has a severe nausea.  States that she had a prior history peptic ulcer disease in the past    10/08/2029  There is history of RLQ abdominal pain off and on for the last 2 months or so.  The pain is gradual in onset, moderate in severity and aching in character.  Frequency being 3-4 times a week.  The pain may last for 20 to 30 minutes.  There is no radiation of abdominal pain.  Eating certain foods like chocolates and knots make the pain worse.  The patient feels better after a bowel movement.  About 3 weeks ago the patient had some associated fever and chills.  Patient also had cramping in the right lower quadrant while sitting on the toilet.  This was associated with a bowel movement.  She denies history of diarrhea or constipation.       The patient has history of recurrent nausea for the last 2 months.  The nausea is described as moderately severe, frequency being several times a week.  Nauseous feeling may last for a couple of hours.  Eating worsens the symptom however no definite relieving factors of nausea.  There is no associated vomiting.     The patient has a history of reflux off and on for the last several years.  The reflux is moderately severe.  Symptoms are described as retrosternal burning sensation, and indigestion.  There is  history of occasional regurgitative symptoms.  Frequency being several times per week.  The symptoms are worse at night.  The patient takes acid suppressive therapy with reasonable control of his symptoms.  Her last colonoscopy was 6 years ago in 2013.  The patient had multiple polyps.  There is no family history of colon cancer, inflammatory bowel disease, celiac disease or chronic liver disease.  There is history of peptic ulcer disease for which the patient was treated in 2003.  Subjective      Past Medical History:   Past Medical History:   Diagnosis Date   • Arthritis    • Back pain 2000   • Body piercing     both ears   • Cervical lymphadenopathy    • Elevated C-reactive protein (CRP)    • GERD (gastroesophageal reflux disease)    • Injury of neck    • Kidney stone 2010   • Neck pain    • Psoriasis    • Ulcer    • Wears contact lenses    • Wears glasses      Past Surgical History:   Past Surgical History:   Procedure Laterality Date   • ANAL FISTULA REPAIR     • COLONOSCOPY  2013   • COLONOSCOPY N/A 6/17/2020    Procedure: COLONOSCOPY;  Surgeon: Miguel A Adams MD;  [facility];  Service: Gastroenterology;  Laterality: N/A;   • ENDOSCOPY     • FACIAL RECONSTRUCTION SURGERY     • HYSTERECTOMY  2001   • KNEE SURGERY Left 2017    arthroscopy   • TONSILLECTOMY     • WISDOM TOOTH EXTRACTION         Family History:   Family History   Problem Relation Age of Onset   • Hypertension Mother    • Diverticulitis Mother    • Hypertension Father    • Diabetes Father        Social History:   Social History     Socioeconomic History   • Marital status: Single     Spouse name: Not on file   • Number of children: Not on file   • Years of education: Not on file   • Highest education level: Not on file   Tobacco Use   • Smoking status: Never Smoker   • Smokeless tobacco: Never Used   Substance and Sexual Activity   • Alcohol use: Yes     Alcohol/week: 2.0 standard drinks     Types: 1 Glasses of wine, 1 Shots of  liquor per week     Comment: occ   • Drug use: No   • Sexual activity: Defer       Medications:     Current Outpatient Medications:   •  Arginine 500 MG capsule, Take  by mouth., Disp: , Rfl:   •  Ascorbic Acid (VITAMIN C PO), Take 500 mg by mouth Daily., Disp: , Rfl:   •  betamethasone dipropionate (DIPROLENE) 0.05 % lotion, betamethasone dipropionate 0.05 % lotion  APPLY FEW DROPS TO AFFECTED AREAS 2 TIMES DAY IN THE MORNING & BEDTIME...., Disp: , Rfl:   •  Calcium Carbonate-Vit D-Min (CALCIUM 1200 PO), Take  by mouth., Disp: , Rfl:   •  dicyclomine (Bentyl) 20 MG tablet, Take 1 tablet by mouth Every 6 (Six) Hours As Needed (Abdominal cramping)., Disp: 20 tablet, Rfl: 0  •  estradiol (ESTRACE) 0.5 MG tablet, Take 0.5 mg by mouth Daily., Disp: , Rfl:   •  gabapentin (NEURONTIN) 100 MG capsule, gabapentin 100 mg capsule  TAKE 1 CAPSULE BY MOUTH THREE TIMES A DAY, Disp: , Rfl:   •  HYDROcodone-acetaminophen (NORCO) 5-325 MG per tablet, Take 1 tablet by mouth Every 6 (Six) Hours As Needed., Disp: , Rfl:   •  Misc Natural Products (GLUCOSAMINE CHONDROITIN ADV PO), Take 1 capsule by mouth Daily., Disp: , Rfl:   •  Omega-3 Fatty Acids (FISH OIL PO), Take 1 capsule by mouth Daily., Disp: , Rfl:   •  ondansetron ODT (ZOFRAN-ODT) 4 MG disintegrating tablet, Place 1 tablet on the tongue Every 8 (Eight) Hours As Needed for Nausea or Vomiting., Disp: 12 tablet, Rfl: 0  •  Red Yeast Rice Extract (RED YEAST RICE PO), Take 1 capsule by mouth Daily., Disp: , Rfl:   •  TURMERIC PO, Take 1 capsule by mouth Daily., Disp: , Rfl:   •  Vitamin D, Cholecalciferol, 50 MCG (2000 UT) capsule, Take  by mouth., Disp: , Rfl:   •  Zinc 10 MG lozenge, Dissolve  in the mouth., Disp: , Rfl:   •  ondansetron (ZOFRAN) 4 MG tablet, ondansetron HCl 4 mg tablet, Disp: , Rfl:     Allergies:   No Known Allergies    Review of Systems:   Review of Systems   Constitutional: Negative for appetite change, fatigue and unexpected weight loss.   HENT: Negative  "for trouble swallowing.    Gastrointestinal: Positive for abdominal pain, blood in stool, diarrhea and nausea. Negative for abdominal distention, anal bleeding, constipation, rectal pain, vomiting, GERD and indigestion.       The following portions of the patient's history were reviewed and updated as appropriate: allergies, current medications, past family history, past medical history, past social history, past surgical history and problem list.    Objective     Physical Exam:  Vital Signs:   Vitals:    02/02/21 1443   BP: 110/70   Pulse: 69   Temp: 96.8 °F (36 °C)   TempSrc: Temporal   SpO2: 97%   Weight: 91.6 kg (202 lb)   Height: 165.1 cm (65\")       Physical Exam  Vitals signs and nursing note reviewed.   Constitutional:       Appearance: She is well-developed.   Eyes:      Conjunctiva/sclera: Conjunctivae normal.   Neck:      Musculoskeletal: Normal range of motion and neck supple.   Cardiovascular:      Rate and Rhythm: Normal rate and regular rhythm.      Heart sounds: No murmur.   Pulmonary:      Effort: Pulmonary effort is normal. No respiratory distress.      Breath sounds: Normal breath sounds.   Abdominal:      General: Bowel sounds are normal. There is no distension.      Palpations: Abdomen is soft. There is no mass.      Tenderness: There is abdominal tenderness (Mild epigastric tenderness noted, also has some discomfort in both lower flank on deep palpation).      Hernia: No hernia is present.   Lymphadenopathy:      Cervical: No cervical adenopathy.   Skin:     General: Skin is warm and dry.   Neurological:      General: No focal deficit present.      Mental Status: She is alert and oriented to person, place, and time.      Sensory: No sensory deficit.         Results Review:   I reviewed the patient's new clinical results.    Hospital Outpatient Visit on 01/25/2021   Component Date Value Ref Range Status   • Creatinine 01/25/2021 0.60  0.60 - 1.30 mg/dL Final    Serial Number: [id number]:  " [id number]   Lab on 01/25/2021   Component Date Value Ref Range Status   • BUN 01/25/2021 10  8 - 23 mg/dL Final   • Creatinine 01/25/2021 0.55* 0.57 - 1.00 mg/dL Final   • eGFR Non  Amer 01/25/2021 111  >60 mL/min/1.73 Final   • WBC 01/25/2021 7.11  3.40 - 10.80 10*3/mm3 Final   • RBC 01/25/2021 4.69  3.77 - 5.28 10*6/mm3 Final   • Hemoglobin 01/25/2021 14.3  12.0 - 15.9 g/dL Final   • Hematocrit 01/25/2021 41.8  34.0 - 46.6 % Final   • MCV 01/25/2021 89.1  79.0 - 97.0 fL Final   • MCH 01/25/2021 30.5  26.6 - 33.0 pg Final   • MCHC 01/25/2021 34.2  31.5 - 35.7 g/dL Final   • RDW 01/25/2021 11.9* 12.3 - 15.4 % Final   • RDW-SD 01/25/2021 38.8  37.0 - 54.0 fl Final   • MPV 01/25/2021 11.1  6.0 - 12.0 fL Final   • Platelets 01/25/2021 168  140 - 450 10*3/mm3 Final   • Neutrophil % 01/25/2021 63.2  42.7 - 76.0 % Final   • Lymphocyte % 01/25/2021 26.7  19.6 - 45.3 % Final   • Monocyte % 01/25/2021 7.3  5.0 - 12.0 % Final   • Eosinophil % 01/25/2021 1.8  0.3 - 6.2 % Final   • Basophil % 01/25/2021 0.4  0.0 - 1.5 % Final   • Immature Grans % 01/25/2021 0.6* 0.0 - 0.5 % Final   • Neutrophils, Absolute 01/25/2021 4.49  1.70 - 7.00 10*3/mm3 Final   • Lymphocytes, Absolute 01/25/2021 1.90  0.70 - 3.10 10*3/mm3 Final   • Monocytes, Absolute 01/25/2021 0.52  0.10 - 0.90 10*3/mm3 Final   • Eosinophils, Absolute 01/25/2021 0.13  0.00 - 0.40 10*3/mm3 Final   • Basophils, Absolute 01/25/2021 0.03  0.00 - 0.20 10*3/mm3 Final   • Immature Grans, Absolute 01/25/2021 0.04  0.00 - 0.05 10*3/mm3 Final   • nRBC 01/25/2021 0.0  0.0 - 0.2 /100 WBC Final   Lab on 12/03/2020   Component Date Value Ref Range Status   • Folate 12/03/2020 16.30  4.78 - 24.20 ng/mL Final   • Glucose 12/03/2020 96  65 - 99 mg/dL Final   • BUN 12/03/2020 12  8 - 23 mg/dL Final   • Creatinine 12/03/2020 0.64  0.57 - 1.00 mg/dL Final   • Sodium 12/03/2020 141  136 - 145 mmol/L Final   • Potassium 12/03/2020 4.2  3.5 - 5.2 mmol/L Final   • Chloride 12/03/2020 106   98 - 107 mmol/L Final   • CO2 12/03/2020 27.3  22.0 - 29.0 mmol/L Final   • Calcium 12/03/2020 9.1  8.6 - 10.5 mg/dL Final   • Total Protein 12/03/2020 7.2  6.0 - 8.5 g/dL Final   • Albumin 12/03/2020 4.00  3.50 - 5.20 g/dL Final   • ALT (SGPT) 12/03/2020 19  1 - 33 U/L Final   • AST (SGOT) 12/03/2020 18  1 - 32 U/L Final   • Alkaline Phosphatase 12/03/2020 51  39 - 117 U/L Final   • Total Bilirubin 12/03/2020 0.7  0.0 - 1.2 mg/dL Final   • eGFR Non African Amer 12/03/2020 93  >60 mL/min/1.73 Final   • Globulin 12/03/2020 3.2  gm/dL Final   • A/G Ratio 12/03/2020 1.3  g/dL Final   • BUN/Creatinine Ratio 12/03/2020 18.8  7.0 - 25.0 Final   • Anion Gap 12/03/2020 7.7  5.0 - 15.0 mmol/L Final   • Total Cholesterol 12/03/2020 230* 0 - 200 mg/dL Final   • Triglycerides 12/03/2020 109  0 - 150 mg/dL Final   • HDL Cholesterol 12/03/2020 73* 40 - 60 mg/dL Final   • LDL Cholesterol  12/03/2020 138* 0 - 100 mg/dL Final   • VLDL Cholesterol 12/03/2020 19  5 - 40 mg/dL Final   • LDL/HDL Ratio 12/03/2020 1.85   Final   • Hemoglobin A1C 12/03/2020 5.56  4.80 - 5.60 % Final   • TSH 12/03/2020 3.070  0.270 - 4.200 uIU/mL Final   • Vitamin B-12 12/03/2020 1,046* 211 - 946 pg/mL Final   • WBC 12/03/2020 6.13  3.40 - 10.80 10*3/mm3 Final   • RBC 12/03/2020 4.62  3.77 - 5.28 10*6/mm3 Final   • Hemoglobin 12/03/2020 14.2  12.0 - 15.9 g/dL Final   • Hematocrit 12/03/2020 41.9  34.0 - 46.6 % Final   • MCV 12/03/2020 90.7  79.0 - 97.0 fL Final   • MCH 12/03/2020 30.7  26.6 - 33.0 pg Final   • MCHC 12/03/2020 33.9  31.5 - 35.7 g/dL Final   • RDW 12/03/2020 13.0  12.3 - 15.4 % Final   • RDW-SD 12/03/2020 42.3  37.0 - 54.0 fl Final   • MPV 12/03/2020 10.9  6.0 - 12.0 fL Final   • Platelets 12/03/2020 174  140 - 450 10*3/mm3 Final   • Neutrophil % 12/03/2020 62.8  42.7 - 76.0 % Final   • Lymphocyte % 12/03/2020 25.4  19.6 - 45.3 % Final   • Monocyte % 12/03/2020 8.2  5.0 - 12.0 % Final   • Eosinophil % 12/03/2020 2.1  0.3 - 6.2 % Final   •  Basophil % 12/03/2020 0.7  0.0 - 1.5 % Final   • Immature Grans % 12/03/2020 0.8* 0.0 - 0.5 % Final   • Neutrophils, Absolute 12/03/2020 3.85  1.70 - 7.00 10*3/mm3 Final   • Lymphocytes, Absolute 12/03/2020 1.56  0.70 - 3.10 10*3/mm3 Final   • Monocytes, Absolute 12/03/2020 0.50  0.10 - 0.90 10*3/mm3 Final   • Eosinophils, Absolute 12/03/2020 0.13  0.00 - 0.40 10*3/mm3 Final   • Basophils, Absolute 12/03/2020 0.04  0.00 - 0.20 10*3/mm3 Final   • Immature Grans, Absolute 12/03/2020 0.05  0.00 - 0.05 10*3/mm3 Final   • nRBC 12/03/2020 0.0  0.0 - 0.2 /100 WBC Final   Admission on 11/05/2020, Discharged on 11/05/2020   Component Date Value Ref Range Status   • Glucose 11/05/2020 132* 65 - 99 mg/dL Final   • BUN 11/05/2020 13  8 - 23 mg/dL Final   • Creatinine 11/05/2020 0.56* 0.57 - 1.00 mg/dL Final   • Sodium 11/05/2020 140  136 - 145 mmol/L Final   • Potassium 11/05/2020 4.1  3.5 - 5.2 mmol/L Final   • Chloride 11/05/2020 103  98 - 107 mmol/L Final   • CO2 11/05/2020 27.6  22.0 - 29.0 mmol/L Final   • Calcium 11/05/2020 9.4  8.6 - 10.5 mg/dL Final   • Total Protein 11/05/2020 7.1  6.0 - 8.5 g/dL Final   • Albumin 11/05/2020 4.50  3.50 - 5.20 g/dL Final   • ALT (SGPT) 11/05/2020 23  1 - 33 U/L Final   • AST (SGOT) 11/05/2020 21  1 - 32 U/L Final   • Alkaline Phosphatase 11/05/2020 60  39 - 117 U/L Final   • Total Bilirubin 11/05/2020 1.0  0.0 - 1.2 mg/dL Final   • eGFR Non African Amer 11/05/2020 109  >60 mL/min/1.73 Final   • Globulin 11/05/2020 2.6  gm/dL Final   • A/G Ratio 11/05/2020 1.7  g/dL Final   • BUN/Creatinine Ratio 11/05/2020 23.2  7.0 - 25.0 Final   • Anion Gap 11/05/2020 9.4  5.0 - 15.0 mmol/L Final   • Lipase 11/05/2020 20  13 - 60 U/L Final   • Color, UA 11/05/2020 Yellow  Yellow, Straw Final   • Appearance, UA 11/05/2020 Cloudy* Clear Final   • pH, UA 11/05/2020 5.5  5.0 - 8.0 Final   • Specific Gravity, UA 11/05/2020 1.024  1.005 - 1.030 Final   • Glucose, UA 11/05/2020 Negative  Negative Final   •  Ketones, UA 11/05/2020 Negative  Negative Final   • Bilirubin, UA 11/05/2020 Negative  Negative Final   • Blood, UA 11/05/2020 Trace* Negative Final   • Protein, UA 11/05/2020 Trace* Negative Final   • Leuk Esterase, UA 11/05/2020 Negative  Negative Final   • Nitrite, UA 11/05/2020 Negative  Negative Final   • Urobilinogen, UA 11/05/2020 0.2 E.U./dL  0.2 - 1.0 E.U./dL Final   • Extra Tube 11/05/2020 hold for add-on   Final    Auto resulted   • Extra Tube 11/05/2020 Hold for add-ons.   Final    Auto resulted.   • Extra Tube 11/05/2020 hold for add-on   Final    Auto resulted   • Extra Tube 11/05/2020 Hold for add-ons.   Final    Auto resulted.   • WBC 11/05/2020 11.11* 3.40 - 10.80 10*3/mm3 Final   • RBC 11/05/2020 4.86  3.77 - 5.28 10*6/mm3 Final   • Hemoglobin 11/05/2020 14.8  12.0 - 15.9 g/dL Final   • Hematocrit 11/05/2020 44.1  34.0 - 46.6 % Final   • MCV 11/05/2020 90.7  79.0 - 97.0 fL Final   • MCH 11/05/2020 30.5  26.6 - 33.0 pg Final   • MCHC 11/05/2020 33.6  31.5 - 35.7 g/dL Final   • RDW 11/05/2020 12.3  12.3 - 15.4 % Final   • RDW-SD 11/05/2020 40.7  37.0 - 54.0 fl Final   • MPV 11/05/2020 10.3  6.0 - 12.0 fL Final   • Platelets 11/05/2020 184  140 - 450 10*3/mm3 Final   • Neutrophil % 11/05/2020 81.4* 42.7 - 76.0 % Final   • Lymphocyte % 11/05/2020 11.9* 19.6 - 45.3 % Final   • Monocyte % 11/05/2020 5.1  5.0 - 12.0 % Final   • Eosinophil % 11/05/2020 0.7  0.3 - 6.2 % Final   • Basophil % 11/05/2020 0.4  0.0 - 1.5 % Final   • Immature Grans % 11/05/2020 0.5  0.0 - 0.5 % Final   • Neutrophils, Absolute 11/05/2020 9.05* 1.70 - 7.00 10*3/mm3 Final   • Lymphocytes, Absolute 11/05/2020 1.32  0.70 - 3.10 10*3/mm3 Final   • Monocytes, Absolute 11/05/2020 0.57  0.10 - 0.90 10*3/mm3 Final   • Eosinophils, Absolute 11/05/2020 0.08  0.00 - 0.40 10*3/mm3 Final   • Basophils, Absolute 11/05/2020 0.04  0.00 - 0.20 10*3/mm3 Final   • Immature Grans, Absolute 11/05/2020 0.05  0.00 - 0.05 10*3/mm3 Final   • nRBC  11/05/2020 0.0  0.0 - 0.2 /100 WBC Final   • RBC, UA 11/05/2020 0-2* None Seen /HPF Final   • WBC, UA 11/05/2020 0-2* None Seen /HPF Final   • Bacteria, UA 11/05/2020 Trace* None Seen /HPF Final   • Squamous Epithelial Cells, UA 11/05/2020 3-6* None Seen, 0-2 /HPF Final   • Hyaline Casts, UA 11/05/2020 3-6  None Seen /LPF Final   • Mucus, UA 11/05/2020 Trace  None Seen, Trace /HPF Final   • Methodology 11/05/2020 Manual Light Microscopy   Final      Ct Chest Without Contrast    Result Date: 12/18/2020  1. A 5 mm right upper lobe nodule favor benign. Recommend 6-12 month chest CT follow-up which may be done without contrast.   This study was performed with techniques to keep radiation doses as low as reasonably achievable (ALARA). Individualized dose reduction techniques using automated exposure control or adjustment of vA and/or kV according to the patient size were employed.  This report was finalized on 12/18/2020 5:22 PM by Yury Luciano MD.    Ct Abdomen Pelvis With Contrast    Result Date: 1/26/2021  Essentially normal contrast-enhanced CT of the abdomen and pelvis.   This report was finalized on 1/26/2021 3:12 PM by Josafat Wan.      Ct Abdomen Pelvis With Contrast    Result Date: 11/5/2020  Fluid-filled, dilated loops of distal small bowel with adjacent mesenteric edema. Findings are likely due to infectious or inflammatory enteritis.  1099.39 mGy.cm   This study was performed with techniques to keep radiation doses as low as reasonably achievable (ALARA). Individualized dose reduction techniques using automated exposure control or adjustment of mA and/or kV according to the patient size were employed.      Images were reviewed, interpreted, and dictated by Dr. Macie Franco M.D. Transcribed by Lexus Figueroa PA-C.  This report was finalized on 11/5/2020 11:55 AM by Macie Franco M.D..      Assessment / Plan      1.  Lower abdominal pain  2. Bloody diarrhea  Suspected infective colitis    He  has a intermittent history of right lower abdominal pain but for the past year or so it did not bother her much. Recently she developed acute diarrhea and also noted red and dark blood in the stool for 3 days. Associated nausea but any fever chills. She had recent CT abdomen pelvis done with contrast which was unremarkable.  CT chest done last year showed a small benign looking lung nodule was unremarkable.  Lab studies done including CBC CMP recent was unremarkable. Her last colonoscopy done in June 2020 revealed only benign polyps. Clinical examination today is very benign without any point tenderness. Her lab studies were unremarkable including hemoglobin. Her symptoms have almost resolved now she had only 1 bowel movement today it was firm.   We will get a stool studies if there is any loose stools  Will hold off any antibiotic for now  Given prescription for Bentyl as needed      3.  Gastroesophageal reflux disease  4. Epigastric pain  She had a prior history of reflux disease however she was not taking any medications. Since her release and diarrhea episode she has been having severe reflux symptoms. She used over-the-counter medication which did not help her. She is worried now whether she has any ulcers as she had a peptic ulcer disease in the past. She does have a epigastric pain and tenderness. She also had a dark blood in the stool, spacious for whether there is any upper GI bleed  We will start her on Protonix 40 g p.o. daily  I will schedule her for EGD for further evaluation  The indications, technique, alternatives and potential risk and complications were discussed with the patient including but not limited to bleeding, bowel perforations, missing lesions and anesthetic complications. The patient understands and wishes to proceed with the procedure and has given their verbal consent. Written patient education information was given to the patient.   The patient will call if they have further questions  before procedure.       5.  Personal history of colon polyps  Last colonoscopy was in June 2022 polyps removed less than 5 mm in size.  Cecal polyp pathology was tubular adenoma without any dysplasia.  Rectal polyp was hyperplastic  She needs a repeat surveillance colonoscopy in 7 years time in 2029 or earlier    6. Obesity BMI 33  Advised regular exercise half hour every day at least 3 days in a week.  She needs to lose at least 15 to 20 pounds.  Reduced calorie intake  and lifestyle and dietary changes  have been discussed  Advised to avoid alcohol and smoking cigarette        Follow Up:   No follow-ups on file.    Kait Stone MD  [facility]  2/2/2021  14:50 EST     Please note that portions of this note may have been completed with a voice recognition program.

## 2021-02-03 ENCOUNTER — LAB (OUTPATIENT)
Dept: LAB | Facility: HOSPITAL | Age: 66
End: 2021-02-03

## 2021-02-03 DIAGNOSIS — Z01.818 PREOP TESTING: ICD-10-CM

## 2021-02-03 PROCEDURE — U0004 COV-19 TEST NON-CDC HGH THRU: HCPCS

## 2021-02-03 PROCEDURE — C9803 HOPD COVID-19 SPEC COLLECT: HCPCS

## 2021-02-03 RX ORDER — IBUPROFEN 200 MG
200 TABLET ORAL EVERY 6 HOURS PRN
COMMUNITY
End: 2021-02-05 | Stop reason: HOSPADM

## 2021-02-04 LAB — SARS-COV-2 RNA RESP QL NAA+PROBE: NOT DETECTED

## 2021-02-05 ENCOUNTER — ANESTHESIA EVENT (OUTPATIENT)
Dept: GASTROENTEROLOGY | Facility: HOSPITAL | Age: 66
End: 2021-02-05

## 2021-02-05 ENCOUNTER — HOSPITAL ENCOUNTER (OUTPATIENT)
Facility: HOSPITAL | Age: 66
Setting detail: HOSPITAL OUTPATIENT SURGERY
Discharge: HOME | End: 2021-02-05
Attending: INTERNAL MEDICINE | Admitting: INTERNAL MEDICINE

## 2021-02-05 ENCOUNTER — ANESTHESIA (OUTPATIENT)
Dept: GASTROENTEROLOGY | Facility: HOSPITAL | Age: 66
End: 2021-02-05

## 2021-02-05 VITALS
HEIGHT: 65 IN | RESPIRATION RATE: 16 BRPM | HEART RATE: 65 BPM | OXYGEN SATURATION: 98 % | SYSTOLIC BLOOD PRESSURE: 140 MMHG | WEIGHT: 202 LBS | TEMPERATURE: 98.2 F | BODY MASS INDEX: 33.66 KG/M2 | DIASTOLIC BLOOD PRESSURE: 68 MMHG

## 2021-02-05 DIAGNOSIS — R10.13 EPIGASTRIC PAIN: ICD-10-CM

## 2021-02-05 DIAGNOSIS — K21.9 GASTROESOPHAGEAL REFLUX DISEASE WITHOUT ESOPHAGITIS: ICD-10-CM

## 2021-02-05 PROCEDURE — 25010000002 PROPOFOL 10 MG/ML EMULSION: Performed by: NURSE ANESTHETIST, CERTIFIED REGISTERED

## 2021-02-05 PROCEDURE — 43239 EGD BIOPSY SINGLE/MULTIPLE: CPT | Performed by: INTERNAL MEDICINE

## 2021-02-05 RX ORDER — PROPOFOL 10 MG/ML
VIAL (ML) INTRAVENOUS AS NEEDED
Status: DISCONTINUED | OUTPATIENT
Start: 2021-02-05 | End: 2021-02-05 | Stop reason: SURG

## 2021-02-05 RX ORDER — LIDOCAINE HYDROCHLORIDE 20 MG/ML
INJECTION, SOLUTION INTRAVENOUS AS NEEDED
Status: DISCONTINUED | OUTPATIENT
Start: 2021-02-05 | End: 2021-02-05 | Stop reason: SURG

## 2021-02-05 RX ORDER — ONDANSETRON 2 MG/ML
4 INJECTION INTRAMUSCULAR; INTRAVENOUS ONCE AS NEEDED
Status: CANCELLED | OUTPATIENT
Start: 2021-02-05 | End: 2021-02-05

## 2021-02-05 RX ORDER — SODIUM CHLORIDE 9 MG/ML
70 INJECTION, SOLUTION INTRAVENOUS CONTINUOUS PRN
Status: DISCONTINUED | OUTPATIENT
Start: 2021-02-05 | End: 2021-02-05 | Stop reason: HOSPADM

## 2021-02-05 RX ADMIN — SODIUM CHLORIDE: 9 INJECTION, SOLUTION INTRAVENOUS at 11:49

## 2021-02-05 RX ADMIN — PROPOFOL 100 MG: 10 INJECTION, EMULSION INTRAVENOUS at 12:01

## 2021-02-05 RX ADMIN — LIDOCAINE HYDROCHLORIDE 100 MG: 20 INJECTION, SOLUTION INTRAVENOUS at 11:51

## 2021-02-05 NOTE — DISCHARGE INSTRUCTIONS
To assist you in voiding:  Drink plenty of fluids  Listen to running water while attempting to void.    If you are unable to urinate and you have an uncomfortable urge to void or it has been   6 hours since you were discharged, return to the Emergency Room    No pushing, pulling, tugging,  heavy lifting, or strenuous activity  .  No major decision making, driving, or drinking alcoholic beverages for 24 hours. ( due to the medications you have  Received)    Always use good hand hygiene/washing techniques.  NO driving while taking pain medications  .    * if you have an incision:  Check your incision area every day for signs of infection.   Check for:  * more redness, swelling, or pain  *more fluid or blood  *warmth  *pus or bad smell  No pushing, pulling, tugging,  heavy lifting, or strenuous activity.  No major decision making, driving, or drinking alcoholic beverages for 24 hours. ( due to the medications you have  received)  Always use good hand hygiene/washing techniques.  NO driving while taking pain medications.    * if you have an incision:  Check your incision area every day for signs of infection.   Check for:  * more redness, swelling, or pain  *more fluid or blood  *warmth  *pus or bad smell                      - Discharge patient to home (ambulatory).   - Resume previous diet.   - Follow an antireflux regimen.   - Continue present medications.   - PPI daily  - Discontnue Advil/ Avoid NSAIDS  - Await pathology results.   - Return to my office in 8 weeks.

## 2021-02-05 NOTE — ANESTHESIA PREPROCEDURE EVALUATION
Anesthesia Evaluation     Patient summary reviewed and Nursing notes reviewed   no history of anesthetic complications:  NPO Solid Status: > 8 hours  NPO Liquid Status: > 8 hours           Airway   Mallampati: II  TM distance: >3 FB  Neck ROM: full  no difficulty expected  Dental - normal exam     Pulmonary - negative pulmonary ROS and normal exam   Cardiovascular - normal exam    Rhythm: regular  Rate: normal    (+) hyperlipidemia,       Neuro/Psych- negative ROS  GI/Hepatic/Renal/Endo    (+)  hiatal hernia, GERD,  renal disease,     Musculoskeletal     (+) neck pain,   Abdominal    Substance History - negative use     OB/GYN negative ob/gyn ROS         Other   arthritis,                      Anesthesia Plan    ASA 2     MAC   (Pt told that intravenous sedation will be used as the primary anesthetic along with local anesthesia if necessary. Every effort will be made to make sure the patient is comfortable.     The patient was told they may or may not have recall for the procedure. It was further explained that if the MAC was not adequate that a general anesthetic with either an LMA or endotracheal tube would be required.     Will proceed with the plan of care.)  intravenous induction     Anesthetic plan, all risks, benefits, and alternatives have been provided, discussed and informed consent has been obtained with: patient.

## 2021-02-05 NOTE — ANESTHESIA POSTPROCEDURE EVALUATION
Patient: Zuri Love    Procedure Summary     Date: 02/05/21 Room / Location: [facility] ENDOSCOPY 2 / [facility] ENDOSCOPY    Anesthesia Start: 1149 Anesthesia Stop: 1205    Procedure: ESOPHAGOGASTRODUODENOSCOPY (N/A Esophagus) Diagnosis:       Gastroesophageal reflux disease without esophagitis      Epigastric pain      (Gastroesophageal reflux disease without esophagitis [K21.9])      (Epigastric pain [R10.13])    Surgeon: Kait Stone MD Provider: Graham Duran CRNA    Anesthesia Type: MAC ASA Status: 2          Anesthesia Type: MAC    Vitals  No vitals data found for the desired time range.          Post Anesthesia Care and Evaluation    Patient location during evaluation: PHASE II  Patient participation: complete - patient participated  Level of consciousness: awake  Pain score: 1  Pain management: adequate  Airway patency: patent  Anesthetic complications: No anesthetic complications  PONV Status: controlled  Cardiovascular status: acceptable and stable  Respiratory status: acceptable  Hydration status: acceptable

## 2021-02-08 NOTE — ANESTHESIA POSTPROCEDURE EVALUATION
Patient: Zuri Love    Procedure Summary     Date: 02/05/21 Room / Location: [facility] ENDOSCOPY 2 / [facility] ENDOSCOPY    Anesthesia Start: 1149 Anesthesia Stop: 1205    Procedure: ESOPHAGOGASTRODUODENOSCOPY (N/A Esophagus) Diagnosis:       Gastroesophageal reflux disease without esophagitis      Epigastric pain      (Gastroesophageal reflux disease without esophagitis [K21.9])      (Epigastric pain [R10.13])    Surgeon: Kait Stone MD Provider: Graham Duran CRNA    Anesthesia Type: MAC ASA Status: 2          Anesthesia Type: MAC    Vitals  Vitals Value Taken Time   /68 02/05/21 1257   Temp 98.2 °F (36.8 °C) 02/05/21 1207   Pulse 65 02/05/21 1257   Resp 16 02/05/21 1257   SpO2 98 % 02/05/21 1257           Post Anesthesia Care and Evaluation    Patient location during evaluation: PHASE II  Patient participation: complete - patient participated  Level of consciousness: awake  Pain score: 1  Pain management: adequate  Airway patency: patent  Anesthetic complications: No anesthetic complications  PONV Status: controlled  Cardiovascular status: acceptable and stable  Respiratory status: acceptable  Hydration status: acceptable

## 2021-02-09 LAB
LAB AP CASE REPORT: NORMAL
PATH REPORT.FINAL DX SPEC: NORMAL

## 2021-03-03 ENCOUNTER — TRANSCRIBE ORDERS (OUTPATIENT)
Dept: ADMINISTRATIVE | Facility: HOSPITAL | Age: 66
End: 2021-03-03

## 2021-03-03 DIAGNOSIS — Z12.31 ENCOUNTER FOR SCREENING MAMMOGRAM FOR MALIGNANT NEOPLASM OF BREAST: Primary | ICD-10-CM

## 2021-03-18 ENCOUNTER — HOSPITAL ENCOUNTER (OUTPATIENT)
Dept: MAMMOGRAPHY | Facility: HOSPITAL | Age: 66
Discharge: HOME | End: 2021-03-18
Admitting: FAMILY MEDICINE

## 2021-03-18 DIAGNOSIS — Z12.31 ENCOUNTER FOR SCREENING MAMMOGRAM FOR MALIGNANT NEOPLASM OF BREAST: ICD-10-CM

## 2021-03-18 PROCEDURE — 77067 SCR MAMMO BI INCL CAD: CPT

## 2021-03-18 PROCEDURE — 77063 BREAST TOMOSYNTHESIS BI: CPT

## 2021-04-07 ENCOUNTER — OFFICE VISIT (OUTPATIENT)
Dept: GASTROENTEROLOGY | Facility: CLINIC | Age: 66
End: 2021-04-07

## 2021-04-07 VITALS
OXYGEN SATURATION: 99 % | DIASTOLIC BLOOD PRESSURE: 80 MMHG | HEART RATE: 69 BPM | WEIGHT: 205 LBS | BODY MASS INDEX: 34.16 KG/M2 | HEIGHT: 65 IN | TEMPERATURE: 98.4 F | SYSTOLIC BLOOD PRESSURE: 122 MMHG

## 2021-04-07 DIAGNOSIS — K21.9 GASTROESOPHAGEAL REFLUX DISEASE WITHOUT ESOPHAGITIS: ICD-10-CM

## 2021-04-07 DIAGNOSIS — K27.9 PEPTIC ULCER DISEASE: Primary | ICD-10-CM

## 2021-04-07 DIAGNOSIS — R10.30 LOWER ABDOMINAL PAIN: ICD-10-CM

## 2021-04-07 PROCEDURE — 99213 OFFICE O/P EST LOW 20 MIN: CPT | Performed by: INTERNAL MEDICINE

## 2021-04-07 RX ORDER — OMEPRAZOLE 20 MG/1
20 TABLET, DELAYED RELEASE ORAL DAILY
Qty: 30 TABLET | Refills: 2 | Status: SHIPPED | OUTPATIENT
Start: 2021-04-07 | End: 2021-04-23

## 2021-04-07 RX ORDER — PANTOPRAZOLE SODIUM 40 MG/1
TABLET, DELAYED RELEASE ORAL
COMMUNITY
End: 2021-04-23

## 2021-04-07 RX ORDER — PANTOPRAZOLE SODIUM 40 MG/1
40 TABLET, DELAYED RELEASE ORAL DAILY
COMMUNITY
Start: 2021-03-29 | End: 2021-04-07 | Stop reason: SDUPTHER
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
## 2021-04-23 RX ORDER — PANTOPRAZOLE SODIUM 20 MG/1
20 TABLET, DELAYED RELEASE ORAL DAILY
Qty: 30 TABLET | Refills: 1 | Status: SHIPPED | OUTPATIENT
Start: 2021-04-23 | End: 2021-05-20

## 2021-04-23 NOTE — TELEPHONE ENCOUNTER
Can you find out why he is asking for protonix instead of the previously prescribed omeprazole? Did his insurance not cover it?

## 2021-05-21 RX ORDER — PANTOPRAZOLE SODIUM 20 MG/1
TABLET, DELAYED RELEASE ORAL
Qty: 30 TABLET | Refills: 1 | Status: SHIPPED | OUTPATIENT
Start: 2021-05-21 | End: 2021-06-25

## 2021-06-25 RX ORDER — PANTOPRAZOLE SODIUM 20 MG/1
20 TABLET, DELAYED RELEASE ORAL AS NEEDED
Qty: 30 TABLET | Refills: 2 | Status: SHIPPED | OUTPATIENT
Start: 2021-06-25 | End: 2021-12-09

## 2021-10-04 ENCOUNTER — OFFICE VISIT (OUTPATIENT)
Dept: GASTROENTEROLOGY | Facility: CLINIC | Age: 66
End: 2021-10-04

## 2021-10-04 VITALS
HEIGHT: 65 IN | WEIGHT: 200 LBS | DIASTOLIC BLOOD PRESSURE: 60 MMHG | TEMPERATURE: 98 F | BODY MASS INDEX: 33.32 KG/M2 | SYSTOLIC BLOOD PRESSURE: 128 MMHG

## 2021-10-04 DIAGNOSIS — K59.03 DRUG-INDUCED CONSTIPATION: ICD-10-CM

## 2021-10-04 DIAGNOSIS — Z87.11 HISTORY OF PEPTIC ULCER DISEASE: ICD-10-CM

## 2021-10-04 DIAGNOSIS — K21.9 GASTROESOPHAGEAL REFLUX DISEASE WITHOUT ESOPHAGITIS: Primary | ICD-10-CM

## 2021-10-04 PROCEDURE — 99213 OFFICE O/P EST LOW 20 MIN: CPT | Performed by: INTERNAL MEDICINE

## 2021-10-04 RX ORDER — OMEPRAZOLE 20 MG/1
20 CAPSULE, DELAYED RELEASE ORAL AS NEEDED
COMMUNITY
End: 2021-10-04

## 2021-10-04 RX ORDER — HYDROXYCHLOROQUINE SULFATE 200 MG/1
200 TABLET, FILM COATED ORAL DAILY
COMMUNITY
End: 2022-04-11

## 2021-10-04 RX ORDER — PREDNISONE 10 MG/1
10 TABLET ORAL AS NEEDED
COMMUNITY

## 2021-10-04 RX ORDER — B-COMPLEX WITH VITAMIN C
TABLET ORAL
COMMUNITY
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
## 2021-12-09 RX ORDER — PANTOPRAZOLE SODIUM 20 MG/1
20 TABLET, DELAYED RELEASE ORAL AS NEEDED
Qty: 30 TABLET | Refills: 2 | Status: SHIPPED | OUTPATIENT
Start: 2021-12-09 | End: 2022-03-07

## 2021-12-20 ENCOUNTER — HOSPITAL ENCOUNTER (OUTPATIENT)
Dept: CT IMAGING | Facility: HOSPITAL | Age: 66
Discharge: HOME | End: 2021-12-20
Admitting: INTERNAL MEDICINE

## 2021-12-20 DIAGNOSIS — R91.1 LUNG NODULE SEEN ON IMAGING STUDY: ICD-10-CM

## 2021-12-20 DIAGNOSIS — R91.1 LUNG NODULE SEEN ON IMAGING STUDY: Primary | ICD-10-CM

## 2021-12-20 PROCEDURE — 71250 CT THORAX DX C-: CPT

## 2022-03-07 RX ORDER — PANTOPRAZOLE SODIUM 20 MG/1
TABLET, DELAYED RELEASE ORAL
Qty: 90 TABLET | Refills: 0 | Status: SHIPPED | OUTPATIENT
Start: 2022-03-07 | End: 2022-06-13

## 2022-03-14 ENCOUNTER — TRANSCRIBE ORDERS (OUTPATIENT)
Dept: ADMINISTRATIVE | Facility: HOSPITAL | Age: 67
End: 2022-03-14

## 2022-03-14 DIAGNOSIS — Z12.31 SCREENING MAMMOGRAM FOR BREAST CANCER: Primary | ICD-10-CM

## 2022-03-16 ENCOUNTER — TRANSCRIBE ORDERS (OUTPATIENT)
Dept: LAB | Facility: HOSPITAL | Age: 67
End: 2022-03-16

## 2022-03-16 DIAGNOSIS — R53.83 TIREDNESS: Primary | ICD-10-CM

## 2022-03-17 ENCOUNTER — LAB (OUTPATIENT)
Dept: LAB | Facility: HOSPITAL | Age: 67
End: 2022-03-17

## 2022-03-17 DIAGNOSIS — R53.83 TIREDNESS: ICD-10-CM

## 2022-03-17 LAB
ALBUMIN SERPL-MCNC: 4.1 G/DL (ref 3.5–5.2)
ALBUMIN/GLOB SERPL: 1.5 G/DL
ALP SERPL-CCNC: 53 U/L (ref 39–117)
ALT SERPL W P-5'-P-CCNC: 16 U/L (ref 1–33)
ANION GAP SERPL CALCULATED.3IONS-SCNC: 9.2 MMOL/L (ref 5–15)
AST SERPL-CCNC: 18 U/L (ref 1–32)
BASOPHILS # BLD AUTO: 0.04 10*3/MM3 (ref 0–0.2)
BASOPHILS NFR BLD AUTO: 0.6 % (ref 0–1.5)
BILIRUB SERPL-MCNC: 0.8 MG/DL (ref 0–1.2)
BUN SERPL-MCNC: 11 MG/DL (ref 8–23)
BUN/CREAT SERPL: 15.1 (ref 7–25)
CALCIUM SPEC-SCNC: 8.5 MG/DL (ref 8.6–10.5)
CHLORIDE SERPL-SCNC: 105 MMOL/L (ref 98–107)
CHOLEST SERPL-MCNC: 226 MG/DL (ref 0–200)
CO2 SERPL-SCNC: 26.8 MMOL/L (ref 22–29)
CREAT SERPL-MCNC: 0.73 MG/DL (ref 0.57–1)
DEPRECATED RDW RBC AUTO: 45 FL (ref 37–54)
EGFRCR SERPLBLD CKD-EPI 2021: 91.4 ML/MIN/1.73
EOSINOPHIL # BLD AUTO: 0.17 10*3/MM3 (ref 0–0.4)
EOSINOPHIL NFR BLD AUTO: 2.6 % (ref 0.3–6.2)
ERYTHROCYTE [DISTWIDTH] IN BLOOD BY AUTOMATED COUNT: 13.2 % (ref 12.3–15.4)
GLOBULIN UR ELPH-MCNC: 2.7 GM/DL
GLUCOSE SERPL-MCNC: 99 MG/DL (ref 65–99)
HCT VFR BLD AUTO: 43.6 % (ref 34–46.6)
HDLC SERPL-MCNC: 72 MG/DL (ref 40–60)
HGB BLD-MCNC: 14.2 G/DL (ref 12–15.9)
IMM GRANULOCYTES # BLD AUTO: 0.03 10*3/MM3 (ref 0–0.05)
IMM GRANULOCYTES NFR BLD AUTO: 0.5 % (ref 0–0.5)
LDLC SERPL CALC-MCNC: 134 MG/DL (ref 0–100)
LDLC/HDLC SERPL: 1.82 {RATIO}
LYMPHOCYTES # BLD AUTO: 2.06 10*3/MM3 (ref 0.7–3.1)
LYMPHOCYTES NFR BLD AUTO: 32 % (ref 19.6–45.3)
MCH RBC QN AUTO: 30.1 PG (ref 26.6–33)
MCHC RBC AUTO-ENTMCNC: 32.6 G/DL (ref 31.5–35.7)
MCV RBC AUTO: 92.6 FL (ref 79–97)
MONOCYTES # BLD AUTO: 0.61 10*3/MM3 (ref 0.1–0.9)
MONOCYTES NFR BLD AUTO: 9.5 % (ref 5–12)
NEUTROPHILS NFR BLD AUTO: 3.53 10*3/MM3 (ref 1.7–7)
NEUTROPHILS NFR BLD AUTO: 54.8 % (ref 42.7–76)
NRBC BLD AUTO-RTO: 0 /100 WBC (ref 0–0.2)
PLATELET # BLD AUTO: 192 10*3/MM3 (ref 140–450)
PMV BLD AUTO: 10.9 FL (ref 6–12)
POTASSIUM SERPL-SCNC: 3.7 MMOL/L (ref 3.5–5.2)
PROT SERPL-MCNC: 6.8 G/DL (ref 6–8.5)
RBC # BLD AUTO: 4.71 10*6/MM3 (ref 3.77–5.28)
SODIUM SERPL-SCNC: 141 MMOL/L (ref 136–145)
TRIGL SERPL-MCNC: 115 MG/DL (ref 0–150)
TSH SERPL DL<=0.05 MIU/L-ACNC: 6.75 UIU/ML (ref 0.27–4.2)
VIT B12 BLD-MCNC: 499 PG/ML (ref 211–946)
VLDLC SERPL-MCNC: 20 MG/DL (ref 5–40)
WBC NRBC COR # BLD: 6.44 10*3/MM3 (ref 3.4–10.8)

## 2022-03-17 PROCEDURE — 80061 LIPID PANEL: CPT

## 2022-03-17 PROCEDURE — 82607 VITAMIN B-12: CPT

## 2022-03-17 PROCEDURE — 36415 COLL VENOUS BLD VENIPUNCTURE: CPT

## 2022-03-17 PROCEDURE — 85025 COMPLETE CBC W/AUTO DIFF WBC: CPT

## 2022-03-17 PROCEDURE — 80053 COMPREHEN METABOLIC PANEL: CPT

## 2022-03-17 PROCEDURE — 84443 ASSAY THYROID STIM HORMONE: CPT

## 2022-04-11 ENCOUNTER — OFFICE VISIT (OUTPATIENT)
Dept: PULMONOLOGY | Facility: CLINIC | Age: 67
End: 2022-04-11

## 2022-04-11 ENCOUNTER — OFFICE VISIT (OUTPATIENT)
Dept: GASTROENTEROLOGY | Facility: CLINIC | Age: 67
End: 2022-04-11

## 2022-04-11 VITALS
OXYGEN SATURATION: 96 % | SYSTOLIC BLOOD PRESSURE: 122 MMHG | RESPIRATION RATE: 18 BRPM | DIASTOLIC BLOOD PRESSURE: 66 MMHG | HEART RATE: 67 BPM | HEIGHT: 65 IN | BODY MASS INDEX: 34.49 KG/M2 | WEIGHT: 207 LBS

## 2022-04-11 VITALS
BODY MASS INDEX: 34.66 KG/M2 | DIASTOLIC BLOOD PRESSURE: 72 MMHG | HEIGHT: 65 IN | SYSTOLIC BLOOD PRESSURE: 126 MMHG | TEMPERATURE: 97.7 F | WEIGHT: 208 LBS

## 2022-04-11 DIAGNOSIS — Z87.11 HISTORY OF PEPTIC ULCER DISEASE: Chronic | ICD-10-CM

## 2022-04-11 DIAGNOSIS — K21.9 GASTROESOPHAGEAL REFLUX DISEASE WITHOUT ESOPHAGITIS: Chronic | ICD-10-CM

## 2022-04-11 DIAGNOSIS — R91.1 LUNG NODULE SEEN ON IMAGING STUDY: Primary | ICD-10-CM

## 2022-04-11 DIAGNOSIS — K59.03 DRUG-INDUCED CONSTIPATION: Primary | Chronic | ICD-10-CM

## 2022-04-11 PROBLEM — Z12.11 COLON CANCER SCREENING: Status: RESOLVED | Noted: 2020-06-10 | Resolved: 2022-04-11

## 2022-04-11 PROCEDURE — 99213 OFFICE O/P EST LOW 20 MIN: CPT | Performed by: NURSE PRACTITIONER

## 2022-04-11 PROCEDURE — 99214 OFFICE O/P EST MOD 30 MIN: CPT | Performed by: NURSE PRACTITIONER

## 2022-04-11 RX ORDER — ESTRADIOL 0.1 MG/G
CREAM VAGINAL
COMMUNITY

## 2022-04-11 RX ORDER — FLUTICASONE FUROATE 27.5 UG/1
SPRAY, METERED NASAL
COMMUNITY
Start: 2022-01-12

## 2022-04-11 RX ORDER — HYDROCODONE BITARTRATE AND ACETAMINOPHEN 5; 325 MG/1; MG/1
1 TABLET ORAL EVERY 12 HOURS PRN
COMMUNITY
Start: 2022-04-06
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
## 2022-04-11 NOTE — PATIENT INSTRUCTIONS
Antireflux measures: Avoid fried, fatty foods, alcohol, chocolate, coffee, tea,  soft drinks, peppermint and spearmint, spicy foods, tomatoes and tomato based foods, onions, peppers, and smoking.   Other antireflux measures include weight reduction if overweight, avoiding tight clothing around the abdomen, elevating the head of the bed 6 inches with blocks under the head board, and don't drink or eat before going to bed and avoid lying down immediately after meals.  Pantoprazole 20 mg 1 by mouth in the am 30 minutes before breakfast.  High fiber, low fat diet with liberal water intake.   Advised to exercise 30 minutes 4-5 days per week.   Advised to lose 20 pounds in the next 6-12 months.   Colonoscopy for surveillance in 2029.  Follow up: 1 year or sooner if needed    Heartburn  Heartburn is a type of pain or discomfort that can happen in the throat or chest. It is often described as a burning pain. It may also cause a bad, acid-like taste in the mouth. Heartburn may feel worse when you lie down or bend over, and it is often worse at night. Heartburn may be caused by stomach contents that move back up into the esophagus (reflux).  Follow these instructions at home:  Eating and drinking    Avoid certain foods and drinks as told by your health care provider. This may include:  Coffee and tea, with or without caffeine.  Drinks that contain alcohol.  Energy drinks and sports drinks.  Carbonated drinks or sodas.  Chocolate and cocoa.  Peppermint and mint flavorings.  Garlic and onions.  Horseradish.  Spicy and acidic foods, including peppers, chili powder, perdomo powder, vinegar, hot sauces, and barbecue sauce.  Citrus fruit juices and citrus fruits, such as oranges, alejandrina, and limes.  Tomato-based foods, such as red sauce, chili, salsa, and pizza with red sauce.  Fried and fatty foods, such as donuts, french fries, potato chips, and high-fat dressings.  High-fat meats, such as hot dogs and fatty cuts of red and white  meats, such as rib eye steak, sausage, ham, and mcnamara.  High-fat dairy items, such as whole milk, butter, and cream cheese.  Eat small, frequent meals instead of large meals.  Avoid drinking large amounts of liquid with your meals.  Avoid eating meals during the 2-3 hours before bedtime.  Avoid lying down right after you eat.  Do not exercise right after you eat.    Lifestyle         If you are overweight, reduce your weight to an amount that is healthy for you. Ask your health care provider for guidance about a safe weight loss goal.  Do not use any products that contain nicotine or tobacco. These products include cigarettes, chewing tobacco, and vaping devices, such as e-cigarettes. These can make symptoms worse. If you need help quitting, ask your health care provider.  Wear loose-fitting clothing. Do not wear anything tight around your waist that causes pressure on your abdomen.  Raise (elevate) the head of your bed about 6 inches (15 cm) when you sleep. You can use a wedge to do this.  Try to reduce your stress, such as with yoga or meditation. If you need help reducing stress, ask your health care provider.  Medicines  Take over-the-counter and prescription medicines only as told by your health care provider.  Do not take aspirin or NSAIDs, such as ibuprofen, unless your health care provider told you to do so.  Stop medicines only as told by your health care provider. If you stop taking some medicines too quickly, your symptoms may get worse.  General instructions  Pay attention to any changes in your symptoms.  Keep all follow-up visits. This is important.  Contact a health care provider if:  You have new symptoms.  You have unexplained weight loss.  You have difficulty swallowing, or it hurts to swallow.  You have wheezing or a persistent cough.  Your symptoms do not improve with treatment.  You have frequent heartburn for more than 2 weeks.  Get help right away if:  You suddenly have pain in your arms,  neck, jaw, teeth, or back.  You suddenly feel sweaty, dizzy, or light-headed.  You have chest pain or shortness of breath.  You vomit and your vomit looks like blood or coffee grounds.  Your stool is bloody or black.  These symptoms may represent a serious problem that is an emergency. Do not wait to see if the symptoms will go away. Get medical help right away. Call your local emergency services (911 in the [place] Do not drive yourself to the hospital.  Summary  Heartburn is a type of pain or discomfort that can happen in the throat or chest. It is often described as a burning pain. It may also cause a bad, acid-like taste in the mouth.  Avoid certain foods and drinks as told by your health care provider.  Take over-the-counter and prescription medicines only as told by your health care provider. Do not take aspirin or NSAIDs, such as ibuprofen, unless your health care provider told you to do so.  Contact a health care provider if your symptoms do not improve or they get worse.  This information is not intended to replace advice given to you by your health care provider. Make sure you discuss any questions you have with your health care provider.  Document Revised: 06/23/2021 Document Reviewed: 06/23/2021  Elsevier Patient Education © 2021 [employer]
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
## 2022-04-12 DIAGNOSIS — R91.1 LUNG NODULE SEEN ON IMAGING STUDY: ICD-10-CM

## 2022-04-28 ENCOUNTER — OFFICE VISIT (OUTPATIENT)
Dept: UROLOGY | Facility: CLINIC | Age: 67
End: 2022-04-28

## 2022-04-28 VITALS
OXYGEN SATURATION: 97 % | TEMPERATURE: 97.7 F | DIASTOLIC BLOOD PRESSURE: 86 MMHG | BODY MASS INDEX: 34.49 KG/M2 | SYSTOLIC BLOOD PRESSURE: 124 MMHG | WEIGHT: 207 LBS | HEIGHT: 65 IN | HEART RATE: 80 BPM

## 2022-04-28 DIAGNOSIS — R39.9 LOWER URINARY TRACT SYMPTOMS (LUTS): Primary | ICD-10-CM

## 2022-04-28 DIAGNOSIS — N32.81 OAB (OVERACTIVE BLADDER): ICD-10-CM

## 2022-04-28 DIAGNOSIS — R31.29 OTHER MICROSCOPIC HEMATURIA: ICD-10-CM

## 2022-04-28 LAB
BILIRUB BLD-MCNC: NEGATIVE MG/DL
CLARITY, POC: ABNORMAL
COLOR UR: ABNORMAL
EXPIRATION DATE: ABNORMAL
GLUCOSE UR STRIP-MCNC: NEGATIVE MG/DL
KETONES UR QL: NEGATIVE
LEUKOCYTE EST, POC: NEGATIVE
Lab: ABNORMAL
NITRITE UR-MCNC: NEGATIVE MG/ML
PH UR: 6 [PH] (ref 5–8)
PROT UR STRIP-MCNC: NEGATIVE MG/DL
RBC # UR STRIP: ABNORMAL /UL
SP GR UR: 1.02 (ref 1–1.03)
UROBILINOGEN UR QL: NORMAL

## 2022-04-28 PROCEDURE — 81003 URINALYSIS AUTO W/O SCOPE: CPT | Performed by: UROLOGY

## 2022-04-28 PROCEDURE — 99204 OFFICE O/P NEW MOD 45 MIN: CPT | Performed by: UROLOGY
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
## 2022-05-09 ENCOUNTER — HOSPITAL ENCOUNTER (OUTPATIENT)
Dept: MAMMOGRAPHY | Facility: HOSPITAL | Age: 67
Discharge: HOME | End: 2022-05-09
Admitting: FAMILY MEDICINE

## 2022-05-09 DIAGNOSIS — Z12.31 SCREENING MAMMOGRAM FOR BREAST CANCER: ICD-10-CM

## 2022-05-09 PROCEDURE — 77063 BREAST TOMOSYNTHESIS BI: CPT

## 2022-05-09 PROCEDURE — 77067 SCR MAMMO BI INCL CAD: CPT

## 2022-06-06 ENCOUNTER — TRANSCRIBE ORDERS (OUTPATIENT)
Dept: LAB | Facility: HOSPITAL | Age: 67
End: 2022-06-06

## 2022-06-06 DIAGNOSIS — R79.89 OTHER SPECIFIED ABNORMAL FINDINGS OF BLOOD CHEMISTRY: Primary | ICD-10-CM

## 2022-06-13 RX ORDER — PANTOPRAZOLE SODIUM 20 MG/1
TABLET, DELAYED RELEASE ORAL
Qty: 90 TABLET | Refills: 0 | Status: SHIPPED | OUTPATIENT
Start: 2022-06-13

## 2022-06-22 ENCOUNTER — LAB (OUTPATIENT)
Dept: LAB | Facility: HOSPITAL | Age: 67
End: 2022-06-22

## 2022-06-22 DIAGNOSIS — R79.89 OTHER SPECIFIED ABNORMAL FINDINGS OF BLOOD CHEMISTRY: ICD-10-CM

## 2022-06-22 LAB
T3FREE SERPL-MCNC: 2.87 PG/ML (ref 2–4.4)
T4 FREE SERPL-MCNC: 1.13 NG/DL (ref 0.93–1.7)
TSH SERPL DL<=0.05 MIU/L-ACNC: 3.58 UIU/ML (ref 0.27–4.2)

## 2022-06-22 PROCEDURE — 84443 ASSAY THYROID STIM HORMONE: CPT

## 2022-06-22 PROCEDURE — 84439 ASSAY OF FREE THYROXINE: CPT

## 2022-06-22 PROCEDURE — 36415 COLL VENOUS BLD VENIPUNCTURE: CPT

## 2022-06-22 PROCEDURE — 84481 FREE ASSAY (FT-3): CPT

## 2022-09-08 ENCOUNTER — PROCEDURE VISIT (OUTPATIENT)
Dept: UROLOGY | Facility: CLINIC | Age: 67
End: 2022-09-08

## 2022-09-08 DIAGNOSIS — R31.29 OTHER MICROSCOPIC HEMATURIA: Primary | ICD-10-CM

## 2022-09-08 DIAGNOSIS — N32.81 OAB (OVERACTIVE BLADDER): ICD-10-CM

## 2022-09-08 PROCEDURE — 52000 CYSTOURETHROSCOPY: CPT | Performed by: UROLOGY

## 2022-09-08 NOTE — PROGRESS NOTES
Preprocedure diagnosis  Microscopic hematuria    Postprocedure diagnosis  No abnormalities identified in the bladder  Stress incontinence on exam  Grade 1-2 pelvic organ prolapse    Procedure  Flexible Cystourethroscopy    Attending surgeon  Rikki Scott MD    Anesthesia  2% lidocaine jelly intraurethrally    Complications  None    Indications  66 y.o. female undergoing a flexible cystoscopy for the above mentioned indications.    Informed consent was obtained.      Findings  Cystoscopy revealed one right and left ureteral orifice in the normal anatomic position, normal bladder mucosa and no tumors, masses or stones.  There were several small urethral polyps which appeared completely benign.    On pelvic exam she did have mild stress urinary incontinence.  She had grade 1-2 cystocele.    Procedure  The patient was placed in supine position and prepped and draped in sterile fashion with lidocaine jelly per urethra for anesthesia.  A timeout was performed.  The 14F flexible cystoscope was lubricated and gently placed through the urethra and into the bladder.  The bladder was completely visualized.  The cystoscope was retroflexed and the bladder neck visualized.  The scope was withdrawn and the procedure terminated.  The patient tolerated the procedure well.      Plan  Follow-up in 2 months with CT  She will think about third line therapies for the urge incontinence, but overall not that bothered right now.

## 2022-10-04 ENCOUNTER — HOSPITAL ENCOUNTER (OUTPATIENT)
Dept: CT IMAGING | Facility: HOSPITAL | Age: 67
Discharge: HOME | End: 2022-10-04
Admitting: UROLOGY

## 2022-10-04 DIAGNOSIS — R31.29 OTHER MICROSCOPIC HEMATURIA: ICD-10-CM

## 2022-10-04 PROCEDURE — 25010000002 IOPAMIDOL 61 % SOLUTION: Performed by: UROLOGY

## 2022-10-04 PROCEDURE — 74178 CT ABD&PLV WO CNTR FLWD CNTR: CPT

## 2022-10-04 RX ADMIN — IOPAMIDOL 100 ML: 612 INJECTION, SOLUTION INTRAVENOUS at 19:35

## 2022-12-05 ENCOUNTER — APPOINTMENT (OUTPATIENT)
Dept: CT IMAGING | Facility: HOSPITAL | Age: 67
End: 2022-12-05

## 2022-12-12 ENCOUNTER — APPOINTMENT (OUTPATIENT)
Dept: CT IMAGING | Facility: HOSPITAL | Age: 67
End: 2022-12-12

## 2022-12-27 ENCOUNTER — APPOINTMENT (OUTPATIENT)
Dept: CT IMAGING | Facility: HOSPITAL | Age: 67
End: 2022-12-27

## 2023-01-06 ENCOUNTER — HOSPITAL ENCOUNTER (OUTPATIENT)
Dept: CT IMAGING | Facility: HOSPITAL | Age: 68
Discharge: HOME | End: 2023-01-06
Admitting: NURSE PRACTITIONER
Payer: MEDICARE

## 2023-01-06 DIAGNOSIS — R91.1 LUNG NODULE SEEN ON IMAGING STUDY: ICD-10-CM

## 2023-01-06 PROCEDURE — 71250 CT THORAX DX C-: CPT

## 2023-01-09 ENCOUNTER — OFFICE VISIT (OUTPATIENT)
Dept: PULMONOLOGY | Facility: CLINIC | Age: 68
End: 2023-01-09
Payer: MEDICARE

## 2023-01-09 VITALS
DIASTOLIC BLOOD PRESSURE: 72 MMHG | BODY MASS INDEX: 33.49 KG/M2 | WEIGHT: 201 LBS | HEART RATE: 75 BPM | SYSTOLIC BLOOD PRESSURE: 126 MMHG | HEIGHT: 65 IN | OXYGEN SATURATION: 95 %

## 2023-01-09 DIAGNOSIS — R91.1 LUNG NODULE SEEN ON IMAGING STUDY: Primary | ICD-10-CM

## 2023-01-09 PROCEDURE — 99212 OFFICE O/P EST SF 10 MIN: CPT | Performed by: NURSE PRACTITIONER

## 2023-01-09 RX ORDER — CALCIUM CARBONATE 260MG(650)
TABLET,CHEWABLE ORAL
COMMUNITY

## 2023-01-09 RX ORDER — AMPICILLIN TRIHYDRATE 250 MG
CAPSULE ORAL
COMMUNITY

## 2023-01-09 RX ORDER — FLUTICASONE FUROATE 27.5 UG/1
SPRAY, METERED NASAL
COMMUNITY
Start: 2022-10-04 | End: 2023-01-09
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
## 2023-02-17 ENCOUNTER — TRANSCRIBE ORDERS (OUTPATIENT)
Dept: GENERAL RADIOLOGY | Facility: HOSPITAL | Age: 68
End: 2023-02-17
Payer: MEDICARE

## 2023-02-17 ENCOUNTER — HOSPITAL ENCOUNTER (OUTPATIENT)
Dept: GENERAL RADIOLOGY | Facility: HOSPITAL | Age: 68
Discharge: HOME | End: 2023-02-17
Admitting: PHYSICIAN ASSISTANT
Payer: MEDICARE

## 2023-02-17 DIAGNOSIS — M25.559 HIP PAIN: ICD-10-CM

## 2023-02-17 DIAGNOSIS — M25.559 HIP PAIN: Primary | ICD-10-CM

## 2023-02-17 PROCEDURE — 73521 X-RAY EXAM HIPS BI 2 VIEWS: CPT

## 2023-04-12 ENCOUNTER — OFFICE VISIT (OUTPATIENT)
Dept: GASTROENTEROLOGY | Facility: CLINIC | Age: 68
End: 2023-04-12
Payer: MEDICARE

## 2023-04-12 VITALS
WEIGHT: 200.8 LBS | HEART RATE: 81 BPM | BODY MASS INDEX: 33.41 KG/M2 | SYSTOLIC BLOOD PRESSURE: 142 MMHG | DIASTOLIC BLOOD PRESSURE: 78 MMHG | OXYGEN SATURATION: 97 %

## 2023-04-12 DIAGNOSIS — E66.09 CLASS 1 OBESITY DUE TO EXCESS CALORIES WITHOUT SERIOUS COMORBIDITY WITH BODY MASS INDEX (BMI) OF 33.0 TO 33.9 IN ADULT: Chronic | ICD-10-CM

## 2023-04-12 DIAGNOSIS — Z86.010 PERSONAL HISTORY OF COLONIC POLYPS: ICD-10-CM

## 2023-04-12 DIAGNOSIS — K21.9 GASTROESOPHAGEAL REFLUX DISEASE WITHOUT ESOPHAGITIS: Chronic | ICD-10-CM

## 2023-04-12 DIAGNOSIS — K59.03 DRUG-INDUCED CONSTIPATION: Primary | Chronic | ICD-10-CM

## 2023-04-12 PROBLEM — Z86.0100 PERSONAL HISTORY OF COLONIC POLYPS: Status: ACTIVE | Noted: 2023-04-12

## 2023-04-12 PROBLEM — E66.811 CLASS 1 OBESITY DUE TO EXCESS CALORIES WITHOUT SERIOUS COMORBIDITY WITH BODY MASS INDEX (BMI) OF 33.0 TO 33.9 IN ADULT: Chronic | Status: ACTIVE | Noted: 2023-04-12

## 2023-04-12 PROCEDURE — 1160F RVW MEDS BY RX/DR IN RCRD: CPT | Performed by: NURSE PRACTITIONER

## 2023-04-12 PROCEDURE — 1159F MED LIST DOCD IN RCRD: CPT | Performed by: NURSE PRACTITIONER

## 2023-04-12 PROCEDURE — 99214 OFFICE O/P EST MOD 30 MIN: CPT | Performed by: NURSE PRACTITIONER
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
## 2023-04-12 NOTE — PATIENT INSTRUCTIONS
Antireflux measures: Avoid fried, fatty foods, alcohol, chocolate, coffee, tea,  soft drinks, peppermint and spearmint, spicy foods, tomatoes and tomato based foods, onions, peppers, and smoking.   Other antireflux measures include weight reduction if overweight, avoiding tight clothing around the abdomen, elevating the head of the bed 6 inches with blocks under the head board, and don't drink or eat before going to bed and avoid lying down immediately after meals.  Pantoprazole 20 mg 1 by mouth in the am 30 minutes before breakfast.  High fiber, low fat diet with liberal water intake.   Advised to exercise 30 minutes 4-5 days per week.   Advised to lose 20 pounds in the next 6-12 months.   Colonoscopy for surveillance in 2025.  Follow up: The patient will call back

## 2023-05-02 ENCOUNTER — TRANSCRIBE ORDERS (OUTPATIENT)
Dept: ADMINISTRATIVE | Facility: HOSPITAL | Age: 68
End: 2023-05-02
Payer: MEDICARE

## 2023-05-02 DIAGNOSIS — M54.50 LUMBAR PAIN: Primary | ICD-10-CM

## 2023-05-15 ENCOUNTER — HOSPITAL ENCOUNTER (OUTPATIENT)
Dept: MRI IMAGING | Facility: HOSPITAL | Age: 68
Discharge: HOME | End: 2023-05-15
Admitting: ORTHOPAEDIC SURGERY
Payer: MEDICARE

## 2023-05-15 DIAGNOSIS — M54.50 LUMBAR PAIN: ICD-10-CM

## 2023-05-15 PROCEDURE — 72148 MRI LUMBAR SPINE W/O DYE: CPT

## 2023-05-30 ENCOUNTER — LAB (OUTPATIENT)
Dept: LAB | Facility: HOSPITAL | Age: 68
End: 2023-05-30

## 2023-05-30 ENCOUNTER — TRANSCRIBE ORDERS (OUTPATIENT)
Dept: LAB | Facility: HOSPITAL | Age: 68
End: 2023-05-30

## 2023-05-30 DIAGNOSIS — R53.83 OTHER FATIGUE: Primary | ICD-10-CM

## 2023-05-30 DIAGNOSIS — R53.83 OTHER FATIGUE: ICD-10-CM

## 2023-05-30 LAB
25(OH)D3 SERPL-MCNC: 25.1 NG/ML (ref 30–100)
ALBUMIN SERPL-MCNC: 4.2 G/DL (ref 3.5–5.2)
ALBUMIN/GLOB SERPL: 1.6 G/DL
ALP SERPL-CCNC: 52 U/L (ref 39–117)
ALT SERPL W P-5'-P-CCNC: 20 U/L (ref 1–33)
ANION GAP SERPL CALCULATED.3IONS-SCNC: 12 MMOL/L (ref 5–15)
AST SERPL-CCNC: 22 U/L (ref 1–32)
BILIRUB SERPL-MCNC: 0.8 MG/DL (ref 0–1.2)
BUN SERPL-MCNC: 13 MG/DL (ref 8–23)
BUN/CREAT SERPL: 18.8 (ref 7–25)
CALCIUM SPEC-SCNC: 8.6 MG/DL (ref 8.6–10.5)
CHLORIDE SERPL-SCNC: 105 MMOL/L (ref 98–107)
CHOLEST SERPL-MCNC: 218 MG/DL (ref 0–200)
CO2 SERPL-SCNC: 23 MMOL/L (ref 22–29)
CREAT SERPL-MCNC: 0.69 MG/DL (ref 0.57–1)
EGFRCR SERPLBLD CKD-EPI 2021: 95.9 ML/MIN/1.73
FERRITIN SERPL-MCNC: 176 NG/ML (ref 13–150)
FOLATE SERPL-MCNC: >20 NG/ML (ref 4.78–24.2)
GLOBULIN UR ELPH-MCNC: 2.6 GM/DL
GLUCOSE SERPL-MCNC: 97 MG/DL (ref 65–99)
HDLC SERPL-MCNC: 71 MG/DL (ref 40–60)
IRON 24H UR-MRATE: 75 MCG/DL (ref 37–145)
IRON SATN MFR SERPL: 22 % (ref 20–50)
LDLC SERPL CALC-MCNC: 127 MG/DL (ref 0–100)
LDLC/HDLC SERPL: 1.74 {RATIO}
POTASSIUM SERPL-SCNC: 3.8 MMOL/L (ref 3.5–5.2)
PROT SERPL-MCNC: 6.8 G/DL (ref 6–8.5)
SODIUM SERPL-SCNC: 140 MMOL/L (ref 136–145)
T4 FREE SERPL-MCNC: 1.13 NG/DL (ref 0.93–1.7)
TIBC SERPL-MCNC: 341 MCG/DL (ref 298–536)
TRANSFERRIN SERPL-MCNC: 229 MG/DL (ref 200–360)
TRIGL SERPL-MCNC: 116 MG/DL (ref 0–150)
TSH SERPL DL<=0.05 MIU/L-ACNC: 4.71 UIU/ML (ref 0.27–4.2)
VIT B12 BLD-MCNC: 1452 PG/ML (ref 211–946)
VLDLC SERPL-MCNC: 20 MG/DL (ref 5–40)

## 2023-05-30 PROCEDURE — 84466 ASSAY OF TRANSFERRIN: CPT

## 2023-05-30 PROCEDURE — 84443 ASSAY THYROID STIM HORMONE: CPT

## 2023-05-30 PROCEDURE — 80061 LIPID PANEL: CPT

## 2023-05-30 PROCEDURE — 82746 ASSAY OF FOLIC ACID SERUM: CPT

## 2023-05-30 PROCEDURE — 36415 COLL VENOUS BLD VENIPUNCTURE: CPT

## 2023-05-30 PROCEDURE — 80053 COMPREHEN METABOLIC PANEL: CPT

## 2023-05-30 PROCEDURE — 82607 VITAMIN B-12: CPT

## 2023-05-30 PROCEDURE — 84439 ASSAY OF FREE THYROXINE: CPT

## 2023-05-30 PROCEDURE — 82306 VITAMIN D 25 HYDROXY: CPT

## 2023-05-30 PROCEDURE — 83540 ASSAY OF IRON: CPT

## 2023-05-30 PROCEDURE — 82728 ASSAY OF FERRITIN: CPT

## 2023-06-06 ENCOUNTER — TRANSCRIBE ORDERS (OUTPATIENT)
Dept: ADMINISTRATIVE | Facility: HOSPITAL | Age: 68
End: 2023-06-06
Payer: MEDICARE

## 2023-06-06 DIAGNOSIS — R94.5 LIVER FUNCTION STUDY, ABNORMAL: Primary | ICD-10-CM

## 2023-08-24 ENCOUNTER — TRANSCRIBE ORDERS (OUTPATIENT)
Dept: ADMINISTRATIVE | Facility: HOSPITAL | Age: 68
End: 2023-08-24
Payer: MEDICARE

## 2023-08-24 DIAGNOSIS — Z12.31 ENCOUNTER FOR SCREENING MAMMOGRAM FOR BREAST CANCER: Primary | ICD-10-CM

## 2023-10-24 ENCOUNTER — TELEPHONE (OUTPATIENT)
Dept: GASTROENTEROLOGY | Facility: CLINIC | Age: 68
End: 2023-10-24
Payer: MEDICARE

## 2023-10-24 DIAGNOSIS — K21.9 GASTROESOPHAGEAL REFLUX DISEASE WITHOUT ESOPHAGITIS: Primary | ICD-10-CM

## 2023-10-24 RX ORDER — PANTOPRAZOLE SODIUM 40 MG/1
TABLET, DELAYED RELEASE ORAL
Qty: 30 TABLET | Refills: 2 | Status: SHIPPED | OUTPATIENT
Start: 2023-10-24

## 2023-10-24 NOTE — TELEPHONE ENCOUNTER
Called patient, information given to patient from provider.  She states understanding, if not improvement on the increased dose, she is encouraged to call back in 1 week.

## 2023-10-24 NOTE — TELEPHONE ENCOUNTER
----- Message from MARKIE Soliz sent at 10/24/2023 12:43 PM EDT -----  She needs to make sure she is following anti-reflux measures - avoid sodas, coffee, tomato based foods, onions, peppers, garlic and chocolates. She will still have breakthrough reflux if she is not watching her diet, even with medication.  Make sure to avoid eating before bed or laying down. I can increase her Pantoprazole to 40 mg daily for a couple of months. She will need to have a follow up appointment in 3 months, go ahead and schedule her. If symptoms are severe or worsening or has black stools, she needs to go to the ER.    ----- Message -----  From: Rohini Colbert MA  Sent: 10/24/2023  11:45 AM EDT  To: MARKIE Soliz    The patient has called and left a message.  She is having knee surgery in 2 weeks, and is trying to prepare, under some stress, says she is having Heartburn, pantoprazole is not helping.  She also says she is having an achy stomach all the time, which also wakes up her at night.  She does not know if adjustments can be made to her medication, or if there is something she can do.  She would really like to pursue with the surgery since she is ready, but she states, that she has called the surgeon to let them know she is having this problem as well.

## 2023-12-12 ENCOUNTER — HOSPITAL ENCOUNTER (OUTPATIENT)
Dept: MAMMOGRAPHY | Facility: HOSPITAL | Age: 68
Discharge: HOME | End: 2023-12-12
Admitting: FAMILY MEDICINE
Payer: MEDICARE

## 2023-12-12 DIAGNOSIS — Z12.31 ENCOUNTER FOR SCREENING MAMMOGRAM FOR BREAST CANCER: ICD-10-CM

## 2023-12-12 PROCEDURE — 77063 BREAST TOMOSYNTHESIS BI: CPT

## 2023-12-12 PROCEDURE — 77067 SCR MAMMO BI INCL CAD: CPT

## 2024-01-19 DIAGNOSIS — K21.9 GASTROESOPHAGEAL REFLUX DISEASE WITHOUT ESOPHAGITIS: ICD-10-CM

## 2024-01-22 RX ORDER — PANTOPRAZOLE SODIUM 40 MG/1
TABLET, DELAYED RELEASE ORAL
Qty: 90 TABLET | Refills: 0 | Status: SHIPPED | OUTPATIENT
Start: 2024-01-22

## 2024-01-24 ENCOUNTER — OFFICE VISIT (OUTPATIENT)
Dept: GASTROENTEROLOGY | Facility: CLINIC | Age: 69
End: 2024-01-24
Payer: MEDICARE

## 2024-01-24 VITALS
WEIGHT: 205 LBS | BODY MASS INDEX: 34.16 KG/M2 | SYSTOLIC BLOOD PRESSURE: 124 MMHG | HEIGHT: 65 IN | HEART RATE: 78 BPM | RESPIRATION RATE: 18 BRPM | DIASTOLIC BLOOD PRESSURE: 80 MMHG | OXYGEN SATURATION: 99 %

## 2024-01-24 DIAGNOSIS — K59.03 DRUG-INDUCED CONSTIPATION: Chronic | ICD-10-CM

## 2024-01-24 DIAGNOSIS — K76.0 FATTY (CHANGE OF) LIVER, NOT ELSEWHERE CLASSIFIED: Chronic | ICD-10-CM

## 2024-01-24 DIAGNOSIS — R16.0 HEPATOMEGALY: Chronic | ICD-10-CM

## 2024-01-24 DIAGNOSIS — E66.09 CLASS 1 OBESITY DUE TO EXCESS CALORIES WITHOUT SERIOUS COMORBIDITY WITH BODY MASS INDEX (BMI) OF 34.0 TO 34.9 IN ADULT: Chronic | ICD-10-CM

## 2024-01-24 DIAGNOSIS — E78.5 HYPERLIPIDEMIA, UNSPECIFIED HYPERLIPIDEMIA TYPE: ICD-10-CM

## 2024-01-24 DIAGNOSIS — Z86.010 PERSONAL HISTORY OF COLONIC POLYPS: Chronic | ICD-10-CM

## 2024-01-24 DIAGNOSIS — Z11.59 ENCOUNTER FOR SCREENING FOR OTHER VIRAL DISEASES: ICD-10-CM

## 2024-01-24 DIAGNOSIS — K21.9 GASTROESOPHAGEAL REFLUX DISEASE WITHOUT ESOPHAGITIS: Primary | Chronic | ICD-10-CM

## 2024-01-24 PROCEDURE — 1159F MED LIST DOCD IN RCRD: CPT | Performed by: NURSE PRACTITIONER

## 2024-01-24 PROCEDURE — 99214 OFFICE O/P EST MOD 30 MIN: CPT | Performed by: NURSE PRACTITIONER

## 2024-01-24 PROCEDURE — 1160F RVW MEDS BY RX/DR IN RCRD: CPT | Performed by: NURSE PRACTITIONER

## 2024-01-24 NOTE — PATIENT INSTRUCTIONS
Antireflux measures: Avoid fried, fatty foods, alcohol, chocolate, coffee, tea,  soft drinks, peppermint and spearmint, spicy foods, tomatoes and tomato based foods, onions, peppers, and smoking.   Other antireflux measures include weight reduction if overweight, avoiding tight clothing around the abdomen, elevating the head of the bed 6 inches with blocks under the head board, and don't drink or eat before going to bed and avoid lying down immediately after meals.  Pantoprazole 40 mg 1 by mouth in the am 30 minutes before breakfast.  High fiber, low fat diet with liberal water intake.   Advised to exercise 30 minutes 4-5 days per week.   Advised to lose 20 pounds in the next 6-12 months.   Labs  Colonoscopy for surveillance in June 2025.  Follow up: 1 year or sooner if needed or abnormal labs
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
## 2024-04-20 DIAGNOSIS — K21.9 GASTROESOPHAGEAL REFLUX DISEASE WITHOUT ESOPHAGITIS: ICD-10-CM

## 2024-04-22 RX ORDER — PANTOPRAZOLE SODIUM 40 MG/1
TABLET, DELAYED RELEASE ORAL
Qty: 90 TABLET | Refills: 1 | Status: SHIPPED | OUTPATIENT
Start: 2024-04-22

## 2024-05-02 ENCOUNTER — TRANSCRIBE ORDERS (OUTPATIENT)
Dept: GENERAL RADIOLOGY | Facility: HOSPITAL | Age: 69
End: 2024-05-02
Payer: MEDICARE

## 2024-05-02 ENCOUNTER — HOSPITAL ENCOUNTER (OUTPATIENT)
Dept: GENERAL RADIOLOGY | Facility: HOSPITAL | Age: 69
Discharge: HOME | End: 2024-05-02
Admitting: PHYSICIAN ASSISTANT
Payer: MEDICARE

## 2024-05-02 DIAGNOSIS — M25.511 RIGHT SHOULDER PAIN, UNSPECIFIED CHRONICITY: Primary | ICD-10-CM

## 2024-05-02 PROCEDURE — 73030 X-RAY EXAM OF SHOULDER: CPT

## 2024-05-16 ENCOUNTER — HOSPITAL ENCOUNTER (EMERGENCY)
Facility: HOSPITAL | Age: 69
Discharge: HOME | End: 2024-05-16
Attending: EMERGENCY MEDICINE
Payer: MEDICARE

## 2024-05-16 ENCOUNTER — APPOINTMENT (OUTPATIENT)
Dept: GENERAL RADIOLOGY | Facility: HOSPITAL | Age: 69
End: 2024-05-16
Payer: MEDICARE

## 2024-05-16 VITALS
HEART RATE: 60 BPM | TEMPERATURE: 97.5 F | RESPIRATION RATE: 16 BRPM | BODY MASS INDEX: 34.16 KG/M2 | SYSTOLIC BLOOD PRESSURE: 142 MMHG | OXYGEN SATURATION: 96 % | DIASTOLIC BLOOD PRESSURE: 68 MMHG | WEIGHT: 205 LBS | HEIGHT: 65 IN

## 2024-05-16 DIAGNOSIS — R07.9 CHEST PAIN, UNSPECIFIED TYPE: Primary | ICD-10-CM

## 2024-05-16 LAB
ALBUMIN SERPL-MCNC: 4.1 G/DL (ref 3.5–5.2)
ALBUMIN/GLOB SERPL: 1.4 G/DL
ALP SERPL-CCNC: 59 U/L (ref 39–117)
ALT SERPL W P-5'-P-CCNC: 21 U/L (ref 1–33)
ANION GAP SERPL CALCULATED.3IONS-SCNC: 12.8 MMOL/L (ref 5–15)
AST SERPL-CCNC: 26 U/L (ref 1–32)
BASOPHILS # BLD AUTO: 0.03 10*3/MM3 (ref 0–0.2)
BASOPHILS NFR BLD AUTO: 0.4 % (ref 0–1.5)
BILIRUB SERPL-MCNC: 0.9 MG/DL (ref 0–1.2)
BUN SERPL-MCNC: 15 MG/DL (ref 8–23)
BUN/CREAT SERPL: 19.7 (ref 7–25)
CALCIUM SPEC-SCNC: 8.8 MG/DL (ref 8.6–10.5)
CHLORIDE SERPL-SCNC: 101 MMOL/L (ref 98–107)
CO2 SERPL-SCNC: 23.2 MMOL/L (ref 22–29)
CREAT SERPL-MCNC: 0.76 MG/DL (ref 0.57–1)
DEPRECATED RDW RBC AUTO: 42.8 FL (ref 37–54)
EGFRCR SERPLBLD CKD-EPI 2021: 86 ML/MIN/1.73
EOSINOPHIL # BLD AUTO: 0.13 10*3/MM3 (ref 0–0.4)
EOSINOPHIL NFR BLD AUTO: 1.6 % (ref 0.3–6.2)
ERYTHROCYTE [DISTWIDTH] IN BLOOD BY AUTOMATED COUNT: 13.2 % (ref 12.3–15.4)
GLOBULIN UR ELPH-MCNC: 2.9 GM/DL
GLUCOSE SERPL-MCNC: 111 MG/DL (ref 65–99)
HCT VFR BLD AUTO: 37.3 % (ref 34–46.6)
HGB BLD-MCNC: 13.3 G/DL (ref 12–15.9)
IMM GRANULOCYTES # BLD AUTO: 0.04 10*3/MM3 (ref 0–0.05)
IMM GRANULOCYTES NFR BLD AUTO: 0.5 % (ref 0–0.5)
LYMPHOCYTES # BLD AUTO: 2.11 10*3/MM3 (ref 0.7–3.1)
LYMPHOCYTES NFR BLD AUTO: 26 % (ref 19.6–45.3)
MCH RBC QN AUTO: 31.7 PG (ref 26.6–33)
MCHC RBC AUTO-ENTMCNC: 35.7 G/DL (ref 31.5–35.7)
MCV RBC AUTO: 88.8 FL (ref 79–97)
MONOCYTES # BLD AUTO: 0.61 10*3/MM3 (ref 0.1–0.9)
MONOCYTES NFR BLD AUTO: 7.5 % (ref 5–12)
NEUTROPHILS NFR BLD AUTO: 5.2 10*3/MM3 (ref 1.7–7)
NEUTROPHILS NFR BLD AUTO: 64 % (ref 42.7–76)
NRBC BLD AUTO-RTO: 0 /100 WBC (ref 0–0.2)
PLATELET # BLD AUTO: 173 10*3/MM3 (ref 140–450)
PMV BLD AUTO: 10.1 FL (ref 6–12)
POTASSIUM SERPL-SCNC: 4.3 MMOL/L (ref 3.5–5.2)
PROT SERPL-MCNC: 7 G/DL (ref 6–8.5)
RBC # BLD AUTO: 4.2 10*6/MM3 (ref 3.77–5.28)
SODIUM SERPL-SCNC: 137 MMOL/L (ref 136–145)
TROPONIN T SERPL HS-MCNC: 6 NG/L
WBC NRBC COR # BLD AUTO: 8.12 10*3/MM3 (ref 3.4–10.8)

## 2024-05-16 PROCEDURE — 99284 EMERGENCY DEPT VISIT MOD MDM: CPT

## 2024-05-16 PROCEDURE — 93005 ELECTROCARDIOGRAM TRACING: CPT | Performed by: EMERGENCY MEDICINE

## 2024-05-16 PROCEDURE — 93005 ELECTROCARDIOGRAM TRACING: CPT | Performed by: PHYSICIAN ASSISTANT

## 2024-05-16 PROCEDURE — 84484 ASSAY OF TROPONIN QUANT: CPT | Performed by: PHYSICIAN ASSISTANT

## 2024-05-16 PROCEDURE — 85025 COMPLETE CBC W/AUTO DIFF WBC: CPT | Performed by: PHYSICIAN ASSISTANT

## 2024-05-16 PROCEDURE — 80053 COMPREHEN METABOLIC PANEL: CPT | Performed by: PHYSICIAN ASSISTANT

## 2024-05-16 PROCEDURE — 71045 X-RAY EXAM CHEST 1 VIEW: CPT

## 2024-05-16 RX ORDER — ASPIRIN 325 MG
325 TABLET ORAL ONCE
Status: COMPLETED | OUTPATIENT
Start: 2024-05-16 | End: 2024-05-16

## 2024-05-16 RX ORDER — SODIUM CHLORIDE 0.9 % (FLUSH) 0.9 %
10 SYRINGE (ML) INJECTION AS NEEDED
Status: DISCONTINUED | OUTPATIENT
Start: 2024-05-16 | End: 2024-05-16 | Stop reason: HOSPADM

## 2024-05-16 RX ADMIN — ASPIRIN 325 MG: 325 TABLET ORAL at 15:17
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
## 2024-05-28 ENCOUNTER — TRANSCRIBE ORDERS (OUTPATIENT)
Dept: LAB | Facility: HOSPITAL | Age: 69
End: 2024-05-28
Payer: MEDICARE

## 2024-05-28 ENCOUNTER — LAB (OUTPATIENT)
Dept: LAB | Facility: HOSPITAL | Age: 69
End: 2024-05-28
Payer: MEDICARE

## 2024-05-28 DIAGNOSIS — Z11.59 ENCOUNTER FOR SCREENING FOR OTHER VIRAL DISEASES: ICD-10-CM

## 2024-05-28 DIAGNOSIS — I10 ESSENTIAL HYPERTENSION: ICD-10-CM

## 2024-05-28 DIAGNOSIS — E78.5 HYPERLIPIDEMIA, UNSPECIFIED HYPERLIPIDEMIA TYPE: Primary | ICD-10-CM

## 2024-05-28 DIAGNOSIS — E78.5 HYPERLIPIDEMIA, UNSPECIFIED HYPERLIPIDEMIA TYPE: ICD-10-CM

## 2024-05-28 DIAGNOSIS — E66.09 CLASS 1 OBESITY DUE TO EXCESS CALORIES WITHOUT SERIOUS COMORBIDITY WITH BODY MASS INDEX (BMI) OF 34.0 TO 34.9 IN ADULT: ICD-10-CM

## 2024-05-28 DIAGNOSIS — E66.09 CLASS 1 OBESITY DUE TO EXCESS CALORIES WITHOUT SERIOUS COMORBIDITY WITH BODY MASS INDEX (BMI) OF 34.0 TO 34.9 IN ADULT: Chronic | ICD-10-CM

## 2024-05-28 DIAGNOSIS — K76.0 FATTY (CHANGE OF) LIVER, NOT ELSEWHERE CLASSIFIED: Chronic | ICD-10-CM

## 2024-05-28 DIAGNOSIS — R16.0 HEPATOMEGALY: ICD-10-CM

## 2024-05-28 DIAGNOSIS — K76.0 FATTY (CHANGE OF) LIVER, NOT ELSEWHERE CLASSIFIED: Primary | ICD-10-CM

## 2024-05-28 DIAGNOSIS — R16.0 HEPATOMEGALY: Chronic | ICD-10-CM

## 2024-05-28 LAB
ALBUMIN SERPL-MCNC: 4.1 G/DL (ref 3.5–5.2)
ALBUMIN/GLOB SERPL: 1.6 G/DL
ALP SERPL-CCNC: 54 U/L (ref 39–117)
ALT SERPL W P-5'-P-CCNC: 19 U/L (ref 1–33)
ANION GAP SERPL CALCULATED.3IONS-SCNC: 10 MMOL/L (ref 5–15)
AST SERPL-CCNC: 15 U/L (ref 1–32)
BASOPHILS # BLD AUTO: 0.02 10*3/MM3 (ref 0–0.2)
BASOPHILS NFR BLD AUTO: 0.2 % (ref 0–1.5)
BILIRUB SERPL-MCNC: 0.7 MG/DL (ref 0–1.2)
BUN SERPL-MCNC: 17 MG/DL (ref 8–23)
BUN/CREAT SERPL: 21.8 (ref 7–25)
CALCIUM SPEC-SCNC: 8.6 MG/DL (ref 8.6–10.5)
CHLORIDE SERPL-SCNC: 104 MMOL/L (ref 98–107)
CHOLEST SERPL-MCNC: 213 MG/DL (ref 0–200)
CO2 SERPL-SCNC: 26 MMOL/L (ref 22–29)
CREAT SERPL-MCNC: 0.78 MG/DL (ref 0.57–1)
DEPRECATED RDW RBC AUTO: 43.9 FL (ref 37–54)
EGFRCR SERPLBLD CKD-EPI 2021: 83.4 ML/MIN/1.73
EOSINOPHIL # BLD AUTO: 0.09 10*3/MM3 (ref 0–0.4)
EOSINOPHIL NFR BLD AUTO: 1.1 % (ref 0.3–6.2)
ERYTHROCYTE [DISTWIDTH] IN BLOOD BY AUTOMATED COUNT: 13 % (ref 12.3–15.4)
GLOBULIN UR ELPH-MCNC: 2.6 GM/DL
GLUCOSE SERPL-MCNC: 97 MG/DL (ref 65–99)
HBV SURFACE AB SER RIA-ACNC: NORMAL
HBV SURFACE AG SERPL QL IA: NORMAL
HCT VFR BLD AUTO: 43.2 % (ref 34–46.6)
HCV AB SER QL: NORMAL
HDLC SERPL-MCNC: 79 MG/DL (ref 40–60)
HGB BLD-MCNC: 14.4 G/DL (ref 12–15.9)
IMM GRANULOCYTES # BLD AUTO: 0.09 10*3/MM3 (ref 0–0.05)
IMM GRANULOCYTES NFR BLD AUTO: 1.1 % (ref 0–0.5)
INR PPP: 0.94 (ref 0.9–1.1)
IRON 24H UR-MRATE: 86 MCG/DL (ref 37–145)
LDLC SERPL CALC-MCNC: 115 MG/DL (ref 0–100)
LDLC/HDLC SERPL: 1.42 {RATIO}
LYMPHOCYTES # BLD AUTO: 2.21 10*3/MM3 (ref 0.7–3.1)
LYMPHOCYTES NFR BLD AUTO: 26.9 % (ref 19.6–45.3)
MCH RBC QN AUTO: 30.6 PG (ref 26.6–33)
MCHC RBC AUTO-ENTMCNC: 33.3 G/DL (ref 31.5–35.7)
MCV RBC AUTO: 91.9 FL (ref 79–97)
MONOCYTES # BLD AUTO: 0.62 10*3/MM3 (ref 0.1–0.9)
MONOCYTES NFR BLD AUTO: 7.6 % (ref 5–12)
NEUTROPHILS NFR BLD AUTO: 5.18 10*3/MM3 (ref 1.7–7)
NEUTROPHILS NFR BLD AUTO: 63.1 % (ref 42.7–76)
NRBC BLD AUTO-RTO: 0 /100 WBC (ref 0–0.2)
PLATELET # BLD AUTO: 227 10*3/MM3 (ref 140–450)
PMV BLD AUTO: 10.4 FL (ref 6–12)
POTASSIUM SERPL-SCNC: 3.8 MMOL/L (ref 3.5–5.2)
PROT SERPL-MCNC: 6.7 G/DL (ref 6–8.5)
PROTHROMBIN TIME: 13.1 SECONDS (ref 12.3–15.1)
RBC # BLD AUTO: 4.7 10*6/MM3 (ref 3.77–5.28)
SODIUM SERPL-SCNC: 140 MMOL/L (ref 136–145)
TRIGL SERPL-MCNC: 109 MG/DL (ref 0–150)
TSH SERPL DL<=0.05 MIU/L-ACNC: 1.85 UIU/ML (ref 0.27–4.2)
VLDLC SERPL-MCNC: 19 MG/DL (ref 5–40)
WBC NRBC COR # BLD AUTO: 8.21 10*3/MM3 (ref 3.4–10.8)

## 2024-05-28 PROCEDURE — 86803 HEPATITIS C AB TEST: CPT

## 2024-05-28 PROCEDURE — 80053 COMPREHEN METABOLIC PANEL: CPT

## 2024-05-28 PROCEDURE — 84443 ASSAY THYROID STIM HORMONE: CPT

## 2024-05-28 PROCEDURE — 36415 COLL VENOUS BLD VENIPUNCTURE: CPT

## 2024-05-28 PROCEDURE — 83883 ASSAY NEPHELOMETRY NOT SPEC: CPT

## 2024-05-28 PROCEDURE — 86706 HEP B SURFACE ANTIBODY: CPT

## 2024-05-28 PROCEDURE — 85610 PROTHROMBIN TIME: CPT

## 2024-05-28 PROCEDURE — 82465 ASSAY BLD/SERUM CHOLESTEROL: CPT

## 2024-05-28 PROCEDURE — 80061 LIPID PANEL: CPT

## 2024-05-28 PROCEDURE — 83010 ASSAY OF HAPTOGLOBIN QUANT: CPT

## 2024-05-28 PROCEDURE — 86708 HEPATITIS A ANTIBODY: CPT

## 2024-05-28 PROCEDURE — 87340 HEPATITIS B SURFACE AG IA: CPT

## 2024-05-28 PROCEDURE — 82172 ASSAY OF APOLIPOPROTEIN: CPT

## 2024-05-28 PROCEDURE — 82977 ASSAY OF GGT: CPT

## 2024-05-28 PROCEDURE — 84478 ASSAY OF TRIGLYCERIDES: CPT

## 2024-05-28 PROCEDURE — 86704 HEP B CORE ANTIBODY TOTAL: CPT

## 2024-05-29 LAB
HAV AB SER QL IA: POSITIVE
HBV CORE AB SERPL QL IA: NEGATIVE

## 2024-05-31 LAB
A2 MACROGLOB SERPL-MCNC: 131 MG/DL (ref 110–276)
ALT SERPL W P-5'-P-CCNC: 19 IU/L (ref 0–40)
APO A-I SERPL-MCNC: 192 MG/DL (ref 116–209)
AST SERPL W P-5'-P-CCNC: 17 IU/L (ref 0–40)
BILIRUB SERPL-MCNC: 0.6 MG/DL (ref 0–1.2)
CHOLEST SERPL-MCNC: 230 MG/DL (ref 100–199)
FIBROSIS SCORING:: ABNORMAL
FIBROSIS STAGE SERPL QL: ABNORMAL
GGT SERPL-CCNC: 15 IU/L (ref 0–60)
GLUCOSE SERPL-MCNC: 98 MG/DL (ref 70–99)
HAPTOGLOB SERPL-MCNC: 136 MG/DL (ref 37–355)
LABORATORY COMMENT REPORT: ABNORMAL
LIVER FIBR SCORE SERPL CALC.FIBROSURE: 0.07 (ref 0–0.21)
LIVER STEATOSIS GRADE SERPL QL: ABNORMAL
LIVER STEATOSIS SCORE SERPL: 0.45 (ref 0–0.4)
NASH GRADE SERPL QL: ABNORMAL
NASH INTERPRETATION SERPL-IMP: ABNORMAL
NASH SCORE SERPL: 0.38 (ref 0–0.25)
NASH SCORING: ABNORMAL
STEATOSIS SCORING: ABNORMAL
TEST PERFORMANCE INFO SPEC: ABNORMAL
TEST PERFORMANCE INFO SPEC: ABNORMAL
TRIGL SERPL-MCNC: 135 MG/DL (ref 0–149)

## 2024-06-05 ENCOUNTER — OFFICE VISIT (OUTPATIENT)
Dept: CARDIOLOGY | Facility: CLINIC | Age: 69
End: 2024-06-05
Payer: MEDICARE

## 2024-06-05 VITALS
SYSTOLIC BLOOD PRESSURE: 132 MMHG | BODY MASS INDEX: 33.92 KG/M2 | OXYGEN SATURATION: 96 % | DIASTOLIC BLOOD PRESSURE: 88 MMHG | HEIGHT: 65 IN | HEART RATE: 76 BPM | WEIGHT: 203.6 LBS | RESPIRATION RATE: 18 BRPM

## 2024-06-05 DIAGNOSIS — R07.9 CHEST PAIN, UNSPECIFIED TYPE: Primary | ICD-10-CM

## 2024-06-05 PROCEDURE — 99204 OFFICE O/P NEW MOD 45 MIN: CPT | Performed by: INTERNAL MEDICINE

## 2024-06-05 RX ORDER — ZOSTER VACCINE RECOMBINANT, ADJUVANTED 50 MCG/0.5
KIT INTRAMUSCULAR
COMMUNITY

## 2024-06-05 RX ORDER — HEPATITIS A VACCINE 1440 [IU]/ML
INJECTION, SUSPENSION INTRAMUSCULAR
COMMUNITY
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
## 2024-06-06 ENCOUNTER — TELEPHONE (OUTPATIENT)
Dept: GASTROENTEROLOGY | Facility: CLINIC | Age: 69
End: 2024-06-06
Payer: MEDICARE

## 2024-06-06 DIAGNOSIS — K21.9 GASTROESOPHAGEAL REFLUX DISEASE WITHOUT ESOPHAGITIS: ICD-10-CM

## 2024-06-06 RX ORDER — PANTOPRAZOLE SODIUM 20 MG/1
20 TABLET, DELAYED RELEASE ORAL DAILY
Qty: 90 TABLET | Refills: 3 | Status: SHIPPED | OUTPATIENT
Start: 2024-06-06

## 2024-06-06 NOTE — TELEPHONE ENCOUNTER
----- Message from Rohini CHILD sent at 6/5/2024  1:52 PM EDT -----  Regarding: FW: Med Question  JR sent the 40 mg.  ----- Message -----  From: Zahida Fiore RegSched Rep  Sent: 6/5/2024   1:38 PM EDT  To: Rohini Colbert MA  Subject: Med Question                                     Rohini,    Patient stopped by the office today to ask about the dosage of Pantoprazole she was prescribed.  She said that she was prescribed 40mg however she would like to go back down to 20mg.  Could this be sent to the pharmacy?    Thank you.  Zahida

## 2024-07-09 ENCOUNTER — TRANSCRIBE ORDERS (OUTPATIENT)
Dept: ADMINISTRATIVE | Facility: HOSPITAL | Age: 69
End: 2024-07-09
Payer: MEDICARE

## 2024-07-09 DIAGNOSIS — G89.29 OTHER CHRONIC PAIN: Primary | ICD-10-CM

## 2024-07-09 DIAGNOSIS — R10.32 ABDOMINAL PAIN, LEFT LOWER QUADRANT: ICD-10-CM

## 2024-09-05 ENCOUNTER — HOSPITAL ENCOUNTER (OUTPATIENT)
Dept: ULTRASOUND IMAGING | Facility: HOSPITAL | Age: 69
Discharge: HOME | End: 2024-09-05
Admitting: FAMILY MEDICINE
Payer: MEDICARE

## 2024-09-05 DIAGNOSIS — R10.32 ABDOMINAL PAIN, LEFT LOWER QUADRANT: ICD-10-CM

## 2024-09-05 PROCEDURE — 76882 US LMTD JT/FCL EVL NVASC XTR: CPT

## 2024-09-11 ENCOUNTER — TRANSCRIBE ORDERS (OUTPATIENT)
Dept: GENERAL RADIOLOGY | Facility: HOSPITAL | Age: 69
End: 2024-09-11
Payer: MEDICARE

## 2024-09-11 ENCOUNTER — HOSPITAL ENCOUNTER (OUTPATIENT)
Dept: GENERAL RADIOLOGY | Facility: HOSPITAL | Age: 69
Discharge: HOME | End: 2024-09-11
Admitting: PHYSICIAN ASSISTANT
Payer: MEDICARE

## 2024-09-11 DIAGNOSIS — M70.61 TROCHANTERIC BURSITIS OF RIGHT HIP: ICD-10-CM

## 2024-09-11 DIAGNOSIS — M70.61 TROCHANTERIC BURSITIS OF RIGHT HIP: Primary | ICD-10-CM

## 2024-09-11 PROCEDURE — 73502 X-RAY EXAM HIP UNI 2-3 VIEWS: CPT

## 2024-09-16 ENCOUNTER — HOSPITAL ENCOUNTER (OUTPATIENT)
Dept: MRI IMAGING | Facility: HOSPITAL | Age: 69
Discharge: HOME | End: 2024-09-16
Admitting: FAMILY MEDICINE
Payer: MEDICARE

## 2024-09-16 DIAGNOSIS — G89.29 OTHER CHRONIC PAIN: ICD-10-CM

## 2024-09-16 PROCEDURE — 72148 MRI LUMBAR SPINE W/O DYE: CPT

## 2024-10-23 ENCOUNTER — TRANSCRIBE ORDERS (OUTPATIENT)
Dept: ADMINISTRATIVE | Facility: HOSPITAL | Age: 69
End: 2024-10-23
Payer: MEDICARE

## 2024-10-23 DIAGNOSIS — M79.652 PAIN OF LEFT THIGH: Primary | ICD-10-CM

## 2025-01-20 ENCOUNTER — OFFICE VISIT (OUTPATIENT)
Dept: GASTROENTEROLOGY | Facility: CLINIC | Age: 70
End: 2025-01-20
Payer: MEDICARE

## 2025-01-20 VITALS
SYSTOLIC BLOOD PRESSURE: 122 MMHG | HEART RATE: 74 BPM | OXYGEN SATURATION: 97 % | WEIGHT: 212 LBS | DIASTOLIC BLOOD PRESSURE: 82 MMHG | BODY MASS INDEX: 35.28 KG/M2

## 2025-01-20 DIAGNOSIS — R16.0 HEPATOMEGALY: Chronic | ICD-10-CM

## 2025-01-20 DIAGNOSIS — E66.09 CLASS 2 OBESITY DUE TO EXCESS CALORIES WITHOUT SERIOUS COMORBIDITY WITH BODY MASS INDEX (BMI) OF 35.0 TO 35.9 IN ADULT: Chronic | ICD-10-CM

## 2025-01-20 DIAGNOSIS — E66.812 CLASS 2 OBESITY DUE TO EXCESS CALORIES WITHOUT SERIOUS COMORBIDITY WITH BODY MASS INDEX (BMI) OF 35.0 TO 35.9 IN ADULT: Chronic | ICD-10-CM

## 2025-01-20 DIAGNOSIS — R10.13 EPIGASTRIC PAIN: Chronic | ICD-10-CM

## 2025-01-20 DIAGNOSIS — Z86.0100 PERSONAL HISTORY OF COLON POLYPS, UNSPECIFIED: Chronic | ICD-10-CM

## 2025-01-20 DIAGNOSIS — K59.03 DRUG-INDUCED CONSTIPATION: Chronic | ICD-10-CM

## 2025-01-20 DIAGNOSIS — K21.9 GASTROESOPHAGEAL REFLUX DISEASE WITHOUT ESOPHAGITIS: Primary | Chronic | ICD-10-CM

## 2025-01-20 DIAGNOSIS — K75.81 METABOLIC DYSFUNCTION-ASSOCIATED STEATOHEPATITIS (MASH): Chronic | ICD-10-CM

## 2025-01-20 PROCEDURE — 1160F RVW MEDS BY RX/DR IN RCRD: CPT | Performed by: NURSE PRACTITIONER

## 2025-01-20 PROCEDURE — 1159F MED LIST DOCD IN RCRD: CPT | Performed by: NURSE PRACTITIONER

## 2025-01-20 PROCEDURE — 99214 OFFICE O/P EST MOD 30 MIN: CPT | Performed by: NURSE PRACTITIONER

## 2025-01-20 RX ORDER — BISACODYL 5 MG/1
TABLET, DELAYED RELEASE ORAL
Qty: 4 TABLET | Refills: 0 | Status: SHIPPED | OUTPATIENT
Start: 2025-01-20

## 2025-01-20 RX ORDER — SODIUM, POTASSIUM,MAG SULFATES 17.5-3.13G
SOLUTION, RECONSTITUTED, ORAL ORAL
Qty: 177 ML | Refills: 0 | Status: SHIPPED | OUTPATIENT
Start: 2025-01-20

## 2025-01-20 RX ORDER — HYDROXYCHLOROQUINE SULFATE 200 MG/1
200 TABLET, FILM COATED ORAL DAILY
COMMUNITY

## 2025-01-20 RX ORDER — SODIUM CHLORIDE 9 MG/ML
70 INJECTION, SOLUTION INTRAVENOUS CONTINUOUS PRN
OUTPATIENT
Start: 2025-01-20 | End: 2025-01-21

## 2025-01-20 NOTE — PATIENT INSTRUCTIONS
Antireflux measures: Avoid fried, fatty foods, alcohol, chocolate, coffee, tea,  soft drinks, peppermint and spearmint, spicy foods, tomatoes and tomato based foods, onions, peppers, and smoking.   Other antireflux measures include weight reduction if overweight, avoiding tight clothing around the abdomen, elevating the head of the bed 6 inches with blocks under the head board, and don't drink or eat before going to bed and avoid lying down immediately after meals.  Pantoprazole 20 mg 1 by mouth in the am 30 minutes before breakfast.  High fiber, low fat diet with liberal water intake.   Advised to exercise 30 minutes 4-5 days per week.   Advised to lose 20 pounds in the next 6-12 months.   The patient is not immune to hepatitis B and may obtain vaccines through the health department or PCP office if not already undertaken.  Colonoscopy for surveillance: The indications, technique, alternatives and potential risk and complications were discussed with the patient including but not limited to bleeding, perforations, missing lesions and anesthetic complications. The patient understands and wishes to proceed with the procedure and has given their verbal consent. Written patient education information was given to the patient.   The patient will call if they have further questions before procedure.
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
## 2025-01-21 ENCOUNTER — TRANSCRIBE ORDERS (OUTPATIENT)
Dept: ADMINISTRATIVE | Facility: HOSPITAL | Age: 70
End: 2025-01-21
Payer: MEDICARE

## 2025-01-21 DIAGNOSIS — Z12.31 VISIT FOR SCREENING MAMMOGRAM: Primary | ICD-10-CM

## 2025-04-28 ENCOUNTER — HOSPITAL ENCOUNTER (OUTPATIENT)
Dept: MAMMOGRAPHY | Facility: HOSPITAL | Age: 70
Discharge: HOME | End: 2025-04-28
Admitting: FAMILY MEDICINE
Payer: MEDICARE

## 2025-04-28 DIAGNOSIS — Z12.31 VISIT FOR SCREENING MAMMOGRAM: ICD-10-CM

## 2025-04-28 PROCEDURE — 77063 BREAST TOMOSYNTHESIS BI: CPT

## 2025-04-28 PROCEDURE — 77067 SCR MAMMO BI INCL CAD: CPT

## 2025-05-01 NOTE — PRE-PROCEDURE INSTRUCTIONS
PAT phone history completed with patient for upcoming procedure on 5/5/25, with Dr. Stone.    PAT PASS reviewed with patient and they verbalize understanding of the following:     Do not eat or drink anything after midnight the night before procedure unless otherwise instructed by physician/surgeon's office, this includes no gum, candy, mints, tobacco products or e-cigarettes.  Do not shave the area to be operated on at least 48 hours prior to procedure.  Do not wear makeup, lotion, hair products, or nail polish.  Do not wear any jewelry and remove all piercings.  Do not wear any adhesive if you wear dentures.  Do not wear contacts; bring in glasses if needed.  Only take medications on the morning of procedure as instructed by PAT nurse per anesthesia guidelines or as instructed by physician's office.  If you are on any blood thinners reach out to the physician/surgeon's office for instructions on when/if they will need to be stopped prior to procedure.  Bring in picture ID and insurance card, advanced directive copies if applicable, CPAP/BIPAP/Inhalers if indicated morning of procedure, leave any other valuables at home.  Ensure you have arranged for someone to drive you home the day of your procedure and someone to care for you at home afterwards. It is recommended that you do not drive, drink alcohol, or make any major legal decisions for at least 24 hours after your procedure is complete.  ERAS instructions given unless otherwise instructed per surgeon's orders.    Instructions given on hospital entrance and registration location.

## 2025-05-05 ENCOUNTER — HOSPITAL ENCOUNTER (OUTPATIENT)
Facility: HOSPITAL | Age: 70
Setting detail: HOSPITAL OUTPATIENT SURGERY
Discharge: HOME | End: 2025-05-05
Attending: INTERNAL MEDICINE | Admitting: INTERNAL MEDICINE
Payer: MEDICARE

## 2025-05-05 ENCOUNTER — ANESTHESIA EVENT (OUTPATIENT)
Dept: GASTROENTEROLOGY | Facility: HOSPITAL | Age: 70
End: 2025-05-05
Payer: MEDICARE

## 2025-05-05 ENCOUNTER — ANESTHESIA (OUTPATIENT)
Dept: GASTROENTEROLOGY | Facility: HOSPITAL | Age: 70
End: 2025-05-05
Payer: MEDICARE

## 2025-05-05 VITALS
BODY MASS INDEX: 33.32 KG/M2 | HEIGHT: 65 IN | HEART RATE: 85 BPM | WEIGHT: 200 LBS | DIASTOLIC BLOOD PRESSURE: 72 MMHG | OXYGEN SATURATION: 99 % | RESPIRATION RATE: 16 BRPM | SYSTOLIC BLOOD PRESSURE: 116 MMHG | TEMPERATURE: 97.6 F

## 2025-05-05 DIAGNOSIS — Z86.0100 PERSONAL HISTORY OF COLON POLYPS, UNSPECIFIED: ICD-10-CM

## 2025-05-05 PROCEDURE — 25010000002 LIDOCAINE HCL (CARDIAC) 100 MG/5ML SOLUTION PREFILLED SYRINGE: Performed by: NURSE ANESTHETIST, CERTIFIED REGISTERED

## 2025-05-05 PROCEDURE — 25010000002 PROPOFOL 200 MG/20ML EMULSION: Performed by: NURSE ANESTHETIST, CERTIFIED REGISTERED

## 2025-05-05 PROCEDURE — 25010000002 PROPOFOL 10 MG/ML EMULSION: Performed by: NURSE ANESTHETIST, CERTIFIED REGISTERED

## 2025-05-05 PROCEDURE — 25810000003 SODIUM CHLORIDE 0.9 % SOLUTION: Performed by: NURSE ANESTHETIST, CERTIFIED REGISTERED

## 2025-05-05 RX ORDER — SIMETHICONE 80 MG
80 TABLET,CHEWABLE ORAL ONCE
Status: COMPLETED | OUTPATIENT
Start: 2025-05-05 | End: 2025-05-05

## 2025-05-05 RX ORDER — PROPOFOL 10 MG/ML
INJECTION, EMULSION INTRAVENOUS AS NEEDED
Status: DISCONTINUED | OUTPATIENT
Start: 2025-05-05 | End: 2025-05-05 | Stop reason: SURG

## 2025-05-05 RX ORDER — SODIUM CHLORIDE 9 MG/ML
INJECTION, SOLUTION INTRAVENOUS CONTINUOUS PRN
Status: DISCONTINUED | OUTPATIENT
Start: 2025-05-05 | End: 2025-05-05 | Stop reason: SURG

## 2025-05-05 RX ORDER — LIDOCAINE HCL/PF 100 MG/5ML
SYRINGE (ML) INJECTION AS NEEDED
Status: DISCONTINUED | OUTPATIENT
Start: 2025-05-05 | End: 2025-05-05 | Stop reason: SURG

## 2025-05-05 RX ORDER — SIMETHICONE 40MG/0.6ML
SUSPENSION, DROPS(FINAL DOSAGE FORM)(ML) ORAL AS NEEDED
Status: DISCONTINUED | OUTPATIENT
Start: 2025-05-05 | End: 2025-05-05 | Stop reason: HOSPADM

## 2025-05-05 RX ORDER — SODIUM CHLORIDE 9 MG/ML
70 INJECTION, SOLUTION INTRAVENOUS CONTINUOUS PRN
Status: DISCONTINUED | OUTPATIENT
Start: 2025-05-05 | End: 2025-05-05 | Stop reason: HOSPADM

## 2025-05-05 RX ADMIN — LIDOCAINE HYDROCHLORIDE 100 MG: 20 INJECTION INTRAVENOUS at 11:17

## 2025-05-05 RX ADMIN — SODIUM CHLORIDE: 9 INJECTION, SOLUTION INTRAVENOUS at 10:52

## 2025-05-05 RX ADMIN — SIMETHICONE 80 MG: 80 TABLET, CHEWABLE ORAL at 12:02

## 2025-05-05 RX ADMIN — PROPOFOL 140 MCG/KG/MIN: 10 INJECTION, EMULSION INTRAVENOUS at 11:01

## 2025-05-05 RX ADMIN — PROPOFOL 100 MG: 10 INJECTION, EMULSION INTRAVENOUS at 11:01

## 2025-05-05 RX ADMIN — LIDOCAINE HYDROCHLORIDE 40 MG: 20 INJECTION INTRAVENOUS at 11:01

## 2025-05-05 NOTE — DISCHARGE INSTRUCTIONS
Rest today  No pushing,pulling,tugging,heavy lifting, or strenuous activity   No major decision making,driving,or drinking alcoholic beverages for 24 hours due to the sedation you received  Always use good hand hygiene/washing technique  No driving on pain medication.    To assist you in voiding:  Drink plenty of fluids  Listen to running water while attempting to void.    If you are unable to urinate and you have an uncomfortable urge to void or it has been   6 hours since you were discharged, return to the Emergency Room.    - Discharge patient to home (ambulatory).   - High fiber diet.   - Continue present medications.   - Await pathology results.   - Repeat colonoscopy in 5 years for surveillance.   - Return to GI office in 8 weeks.
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
## 2025-05-05 NOTE — ANESTHESIA POSTPROCEDURE EVALUATION
Patient: Zuri Love    Procedure Summary       Date: 05/05/25 Room / Location: [facility] ENDOSCOPY 2 / [facility] ENDOSCOPY    Anesthesia Start: 1052 Anesthesia Stop: 1119    Procedure: Colonoscopy with polypectomy (Anus) Diagnosis:       Personal history of colon polyps, unspecified      (Personal history of colon polyps, unspecified [Z86.0100])    Surgeons: Kait Stone MD Provider: Manish Candelaria CRNA    Anesthesia Type: MAC ASA Status: 2            Anesthesia Type: MAC    Vitals  HR 83  Sat 96  Resp 22  /80  Temp 98        Post Anesthesia Care and Evaluation    Patient location during evaluation: bedside  Patient participation: complete - patient participated  Level of consciousness: awake and alert and sleepy but conscious  Pain score: 0  Pain management: adequate    Airway patency: patent  Anesthetic complications: No anesthetic complications  PONV Status: none  Cardiovascular status: acceptable  Respiratory status: acceptable and nasal cannula  Hydration status: acceptable

## 2025-05-05 NOTE — ANESTHESIA PREPROCEDURE EVALUATION
Anesthesia Evaluation     Patient summary reviewed and Nursing notes reviewed   no history of anesthetic complications:   NPO Solid Status: > 8 hours  NPO Liquid Status: > 8 hours           Airway   Mallampati: II  TM distance: >3 FB  Neck ROM: full  no difficulty expected and Possible difficult intubation  Dental - normal exam     Pulmonary - negative pulmonary ROS and normal exam   Cardiovascular - normal exam    (+) hyperlipidemia      Neuro/Psych- negative ROS  GI/Hepatic/Renal/Endo    (+) obesity, hiatal hernia, GERD, renal disease- stones    Musculoskeletal     (+) neck pain  Abdominal    Substance History - negative use     OB/GYN negative ob/gyn ROS         Other   arthritis,                   Anesthesia Plan    ASA 2     MAC     (Pt told that intravenous sedation will be used as the primary anesthetic along with local anesthesia if necessary. Every effort will be made to make sure the patient is comfortable.     The patient was told they may or may not have recall for the procedure. It was further explained that if the MAC was not adequate that a general anesthetic with either an LMA or endotracheal tube would be required.     Will proceed with the plan of care.)  intravenous induction     Anesthetic plan, risks, benefits, and alternatives have been provided, discussed and informed consent has been obtained with: patient.

## 2025-05-06 LAB — REF LAB TEST METHOD: NORMAL

## 2025-06-11 DIAGNOSIS — K21.9 GASTROESOPHAGEAL REFLUX DISEASE WITHOUT ESOPHAGITIS: ICD-10-CM

## 2025-06-11 RX ORDER — PANTOPRAZOLE SODIUM 20 MG/1
20 TABLET, DELAYED RELEASE ORAL DAILY
Qty: 90 TABLET | Refills: 3 | Status: SHIPPED | OUTPATIENT
Start: 2025-06-11

## 2025-08-27 ENCOUNTER — OFFICE VISIT (OUTPATIENT)
Dept: GASTROENTEROLOGY | Facility: CLINIC | Age: 70
End: 2025-08-27
Payer: MEDICARE

## 2025-08-27 VITALS
OXYGEN SATURATION: 99 % | SYSTOLIC BLOOD PRESSURE: 104 MMHG | HEART RATE: 84 BPM | WEIGHT: 205 LBS | BODY MASS INDEX: 34.11 KG/M2 | DIASTOLIC BLOOD PRESSURE: 64 MMHG

## 2025-08-27 DIAGNOSIS — K59.03 OPIOID-INDUCED CONSTIPATION: Chronic | ICD-10-CM

## 2025-08-27 DIAGNOSIS — R10.13 EPIGASTRIC PAIN: Chronic | ICD-10-CM

## 2025-08-27 DIAGNOSIS — T40.2X5A OPIOID-INDUCED CONSTIPATION: Chronic | ICD-10-CM

## 2025-08-27 DIAGNOSIS — R11.0 NAUSEA: Chronic | ICD-10-CM

## 2025-08-27 DIAGNOSIS — E66.09 CLASS 1 OBESITY DUE TO EXCESS CALORIES WITHOUT SERIOUS COMORBIDITY WITH BODY MASS INDEX (BMI) OF 34.0 TO 34.9 IN ADULT: Chronic | ICD-10-CM

## 2025-08-27 DIAGNOSIS — K76.0 FATTY (CHANGE OF) LIVER, NOT ELSEWHERE CLASSIFIED: Chronic | ICD-10-CM

## 2025-08-27 DIAGNOSIS — E66.811 CLASS 1 OBESITY DUE TO EXCESS CALORIES WITHOUT SERIOUS COMORBIDITY WITH BODY MASS INDEX (BMI) OF 34.0 TO 34.9 IN ADULT: Chronic | ICD-10-CM

## 2025-08-27 DIAGNOSIS — R16.0 HEPATOMEGALY: Chronic | ICD-10-CM

## 2025-08-27 DIAGNOSIS — D12.4 ADENOMATOUS POLYP OF DESCENDING COLON: Chronic | ICD-10-CM

## 2025-08-27 DIAGNOSIS — K21.9 GASTROESOPHAGEAL REFLUX DISEASE WITHOUT ESOPHAGITIS: Primary | Chronic | ICD-10-CM

## 2025-08-27 DIAGNOSIS — Z13.6 ENCOUNTER FOR SCREENING FOR CARDIOVASCULAR DISORDERS: ICD-10-CM

## 2025-08-27 DIAGNOSIS — K75.81 METABOLIC DYSFUNCTION-ASSOCIATED STEATOHEPATITIS (MASH): Chronic | ICD-10-CM

## 2025-08-27 RX ORDER — SODIUM CHLORIDE 9 MG/ML
70 INJECTION, SOLUTION INTRAVENOUS CONTINUOUS PRN
OUTPATIENT
Start: 2025-08-27 | End: 2025-08-28

## (undated) DEVICE — LUBE JELLY PK/2.75GM STRL BX/144

## (undated) DEVICE — Device

## (undated) DEVICE — SNAR POLYP SENSATION STDOVL 27 240 BX40

## (undated) DEVICE — HYBRID CO2 TUBING/CAP SET FOR OLYMPUS® SCOPES & CO2 SOURCE: Brand: ERBE

## (undated) DEVICE — SUCTION CANISTER, 1500CC, RIGID: Brand: DEROYAL

## (undated) DEVICE — SYR LUER SLPTP 50ML

## (undated) DEVICE — HYBRID TUBING/CAP SET FOR OLYMPUS® SCOPES: Brand: ERBE

## (undated) DEVICE — ENDOSCOPY PORT CONNECTOR FOR OLYMPUS® SCOPES: Brand: ERBE

## (undated) DEVICE — QUICK CATCH IN-LINE SUCTION POLYP TRAP IS USED FOR SUCTION RETRIEVAL OF ENDOSCOPICALLY REMOVED POLYPS.

## (undated) DEVICE — VLV SXN AIR/H2O ORCAPOD3 1P/U STRL

## (undated) DEVICE — SINGLE-USE POLYPECTOMY SNARE: Brand: CAPTIVATOR II

## (undated) DEVICE — FRCP BIOP COLD ENDOJAW ALLGTR W/NDL 2.8X2300MM BLU

## (undated) DEVICE — CONMED SCOPE SAVER BITE BLOCK, 20X27 MM: Brand: SCOPE SAVER

## (undated) DEVICE — PAD GRND REM POLYHESIVE A/ DISP

## (undated) DEVICE — CANISTER, RIGID, 2000CC: Brand: MEDLINE INDUSTRIES, INC.